# Patient Record
Sex: FEMALE | Race: WHITE | NOT HISPANIC OR LATINO | Employment: OTHER | URBAN - METROPOLITAN AREA
[De-identification: names, ages, dates, MRNs, and addresses within clinical notes are randomized per-mention and may not be internally consistent; named-entity substitution may affect disease eponyms.]

---

## 2017-02-15 ENCOUNTER — HOSPITAL ENCOUNTER (OUTPATIENT)
Dept: RADIOLOGY | Facility: MEDICAL CENTER | Age: 62
End: 2017-02-15
Attending: OBSTETRICS & GYNECOLOGY
Payer: COMMERCIAL

## 2017-02-15 DIAGNOSIS — Z13.9 SCREENING: ICD-10-CM

## 2017-02-15 PROCEDURE — G0202 SCR MAMMO BI INCL CAD: HCPCS

## 2017-02-17 ENCOUNTER — OFFICE VISIT (OUTPATIENT)
Dept: MEDICAL GROUP | Facility: MEDICAL CENTER | Age: 62
End: 2017-02-17
Payer: COMMERCIAL

## 2017-02-17 VITALS
BODY MASS INDEX: 24.55 KG/M2 | SYSTOLIC BLOOD PRESSURE: 118 MMHG | HEIGHT: 61 IN | DIASTOLIC BLOOD PRESSURE: 86 MMHG | OXYGEN SATURATION: 97 % | WEIGHT: 130 LBS | HEART RATE: 96 BPM | TEMPERATURE: 99.3 F

## 2017-02-17 DIAGNOSIS — K21.9 GASTROESOPHAGEAL REFLUX DISEASE, ESOPHAGITIS PRESENCE NOT SPECIFIED: ICD-10-CM

## 2017-02-17 PROCEDURE — 99213 OFFICE O/P EST LOW 20 MIN: CPT | Performed by: PHYSICIAN ASSISTANT

## 2017-02-17 RX ORDER — OMEPRAZOLE 20 MG/1
20 CAPSULE, DELAYED RELEASE ORAL DAILY
Qty: 30 CAP | Refills: 4 | Status: SHIPPED | OUTPATIENT
Start: 2017-02-17 | End: 2017-06-20 | Stop reason: SDUPTHER

## 2017-02-17 RX ORDER — RANITIDINE 150 MG/1
150 TABLET ORAL 2 TIMES DAILY
COMMUNITY
End: 2017-02-17

## 2017-02-17 ASSESSMENT — PAIN SCALES - GENERAL: PAINLEVEL: NO PAIN

## 2017-02-17 ASSESSMENT — PATIENT HEALTH QUESTIONNAIRE - PHQ9: CLINICAL INTERPRETATION OF PHQ2 SCORE: 0

## 2017-02-17 NOTE — ASSESSMENT & PLAN NOTE
Has been treating with zantac qd for a couple month.   Just a couple days ago she start burping and tasted acid in her mouth.  He does have a history of acid reflux. She has taken omeprazole in the past and it has helped.  Although she stopped this for a while and also felt fine. She was controlled with just dietary changes.   EGD- about 5 years ago, hiatal hernia but no gastritis  Diet: tried to eat healthy, hasn't noticed a correlations.  Eats around 6-7 pm. Bed around 10pm. Has not tried a wedge  Exercise: just joined gyn last week.   Had some epigastric pain this am but resolved on it's own.   Denies nausea, vomiting, sore throat, coughing, shortness of breath, tarry stools, blood in stool.  Drinks 3 cups caffeine in am   1 drink etoh nightly.   No drug use.   Aleve maybe once weekly.

## 2017-02-17 NOTE — PROGRESS NOTES
Subjective:     Chief Complaint   Patient presents with   • Heartburn     burping up acid x 1 day     Lovely Palmer is a 62 y.o. female here today for GERD as listed below    GERD (gastroesophageal reflux disease)  Has been treating with zantac qd for a couple month.   Just a couple days ago she start burping and tasted acid in her mouth.  He does have a history of acid reflux. She has taken omeprazole in the past and it has helped.  Although she stopped this for a while and also felt fine. She was controlled with just dietary changes.   EGD- about 5 years ago, hiatal hernia but no gastritis  Diet: tried to eat healthy, hasn't noticed a correlations.  Eats around 6-7 pm. Bed around 10pm. Has not tried a wedge  Exercise: just joined gyn last week.   Had some epigastric pain this am but resolved on it's own.   Denies nausea, vomiting, sore throat, coughing, shortness of breath, tarry stools, blood in stool.  Drinks 3 cups caffeine in am   1 drink etoh nightly.   No drug use.   Aleve maybe once weekly.        's stepsister- passed away with pancreatic cancer    Current medicines (including changes today)  Current Outpatient Prescriptions   Medication Sig Dispense Refill   • omeprazole (PRILOSEC) 20 MG delayed-release capsule Take 1 Cap by mouth every day. 30 Cap 4   • lisinopril (PRINIVIL) 5 MG Tab TAKE 1 TAB BY MOUTH EVERY DAY. 30 Tab 2   • Calcium Acetate, Phos Binder, (CALCIUM ACETATE PO) Take  by mouth.     • ETHIN ESTRADIOL-NORETHIND ACE (JINTELI) 1-5 MG-MCG TABS Take 1 Tab by mouth every morning.     • paroxetine (PAXIL) 20 MG TABS Take 20 mg by mouth every morning. Indications: Generalized Anxiety Disorder       No current facility-administered medications for this visit.     She  has a past medical history of Anxiety (10/30/2014); Bilateral hand numbness (10/30/2014); GERD (gastroesophageal reflux disease) (10/30/2014); Hiatus hernia syndrome; and Pain (08-04-15).    ROS   No chest pain, no shortness  "of breath, + abdominal pain       Objective:     Blood pressure 118/86, pulse 96, temperature 37.4 °C (99.3 °F), height 1.549 m (5' 0.98\"), weight 58.968 kg (130 lb), SpO2 97 %. Body mass index is 24.58 kg/(m^2).   Physical Exam:  Alert, oriented in no acute distress.  Eye contact is good, speech goal directed, affect calm  HEENT: conjunctiva non-injected, sclera non-icteric, PERRL.  Neck No adenopathy or masses in the neck or supraclavicular regions.  Lungs: clear to auscultation bilaterally with good excursion.  CV: regular rate and rhythm.  Abdomen: soft, nontender, no HSM, No CVAT  Ext: no edema, color normal, peripheral pulses 2+, temperature normal        Assessment and Plan:   The following treatment plan was discussed     1. Gastroesophageal reflux disease, esophagitis presence not specified  Flaring, will switch from Zantac to omeprazole 20mg qd.   Discussed diet changes, increasing exercise, elevated bed. Continue not eating right before bed. Portion size.   Will f/u in 3-4 weeks. Sooner if needed.   If no change or still flaring then we will order labs.   - omeprazole (PRILOSEC) 20 MG delayed-release capsule; Take 1 Cap by mouth every day.  Dispense: 30 Cap; Refill: 4      Followup: Return in about 4 weeks (around 3/17/2017) for gerd, laso due for annual exam.           Please note that this dictation was created using voice recognition software. I have made every reasonable attempt to correct obvious errors, but I expect that there are errors of grammar and possibly content that I did not discover before finalizing the note.  "

## 2017-02-17 NOTE — MR AVS SNAPSHOT
"        Lovely Palmer   2017 1:40 PM   Office Visit   MRN: [id number]    Department:  [place] [address]   Dept Phone:  [phone]    Description:  Female : 1955   Provider:  Zoë Rashid PA-C           Reason for Visit     Heartburn burping up acid x 1 day      Allergies as of 2017     No Known Allergies      You were diagnosed with     Gastroesophageal reflux disease, esophagitis presence not specified   [[id number]]         Vital Signs     Blood Pressure Pulse Temperature Height Weight Body Mass Index    118/86 mmHg 96 37.4 °C (99.3 °F) 1.549 m (5' 0.98\") 58.968 kg (130 lb) 24.58 kg/m2    Oxygen Saturation Smoking Status                97% Never Smoker           Basic Information     Date Of Birth Sex Race Ethnicity Preferred Language    1955 Female White Non- English      Your appointments     Mar 13, 2017  3:40 PM   ANNUAL EXAM PREVENTATIVE with Zoë Rashid PA-C   [facility] ([place])    [address]  [address]  [place] [address]   [phone]              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   2013 - Present    Anxiety F41.9   10/30/2014 - Present    Bilateral hand numbness R20.0   10/30/2014 - Present    GERD (gastroesophageal reflux disease) K21.9   10/30/2014 - Present    Abdominal cramping R10.9   2015 - Present    Thoracic or lumbosacral neuritis or radiculitis, unspecified [id number]   2015 - Present    Essential hypertension I10   2015 - Present      Health Maintenance        Date Due Completion Dates    IMM DTaP/Tdap/Td Vaccine (1 - Tdap) 1974 ---    IMM ZOSTER VACCINE 2015 ---    PAP SMEAR 2016 (Prv Comp)    Override on 2013: Previously completed    IMM INFLUENZA (1) 2016 10/1/2011    MAMMOGRAM 2/15/2018 2/15/2017, 2015, 2014, 2013, 2012, 2012, " 4/7/2011, 4/6/2010, 4/6/2010, 4/2/2009, 3/16/2009, 3/16/2009, 3/12/2008, 3/12/2008, 3/12/2007    COLONOSCOPY 1/30/2021 1/30/2011 (Prv Comp)    Override on 1/30/2011: Previously completed (10 year)            Current Immunizations     Influenza TIV (IM) 10/1/2011      Below and/or attached are the medications your provider expects you to take. Review all of your home medications and newly ordered medications with your provider and/or pharmacist. Follow medication instructions as directed by your provider and/or pharmacist. Please keep your medication list with you and share with your provider. Update the information when medications are discontinued, doses are changed, or new medications (including over-the-counter products) are added; and carry medication information at all times in the event of emergency situations     Allergies:  No Known Allergies          Medications  Valid as of: February 17, 2017 -  2:13 PM    Generic Name Brand Name Tablet Size Instructions for use    Calcium Acetate (Phos Binder)   Take  by mouth.        Lisinopril (Tab) PRINIVIL 5 MG TAKE 1 TAB BY MOUTH EVERY DAY.        Norethindrone-Eth Estradiol (Tab) ETHIN ESTRADIOL-NORETHIND ACE 1-5 MG-MCG Take 1 Tab by mouth every morning.        Omeprazole (CAPSULE DELAYED RELEASE) PRILOSEC 20 MG Take 1 Cap by mouth every day.        PARoxetine HCl (Tab) PAXIL 20 MG Take 20 mg by mouth every morning. Indications: Generalized Anxiety Disorder        .                 Medicines prescribed today were sent to:     [id number] [place] - [address]    [address] [facility] [address]    Phone: [phone] Fax: [phone]    Open 24 Hours?: No    [id number] - [place] - [address]    [address] [facility] [address]    Phone: [phone] Fax: [phone]    Open 24 Hours?: No      Medication refill instructions:       If your prescription bottle indicates you have medication refills left, it is not necessary to  call your provider’s office. Please contact your pharmacy and they will refill your medication.    If your prescription bottle indicates you do not have any refills left, you may request refills at any time through one of the following ways: The online LawP[employer] system (except Urgent Care), by calling your provider’s office, or by asking your pharmacy to contact your provider’s office with a refill request. Medication refills are processed only during regular business hours and may not be available until the next business day. Your provider may request additional information or to have a follow-up visit with you prior to refilling your medication.   *Please Note: Medication refills are assigned a new Rx number when refilled electronically. Your pharmacy may indicate that no refills were authorized even though a new prescription for the same medication is available at the pharmacy. Please request the medicine by name with the pharmacy before contacting your provider for a refill.           LawPivot Access Code: Activation code not generated  Current LawPivot Status: Active

## 2017-02-17 NOTE — PATIENT INSTRUCTIONS
Food Choices for Gastroesophageal Reflux Disease, Adult  When you have gastroesophageal reflux disease (GERD), the foods you eat and your eating habits are very important. Choosing the right foods can help ease the discomfort of GERD.  WHAT GENERAL GUIDELINES DO I NEED TO FOLLOW?  · Choose fruits, vegetables, whole grains, low-fat dairy products, and low-fat meat, fish, and poultry.  · Limit fats such as oils, salad dressings, butter, nuts, and avocado.  · Keep a food diary to identify foods that cause symptoms.  · Avoid foods that cause reflux. These may be different for different people.  · Eat frequent small meals instead of three large meals each day.  · Eat your meals slowly, in a relaxed setting.  · Limit fried foods.  · Cook foods using methods other than frying.  · Avoid drinking alcohol.  · Avoid drinking large amounts of liquids with your meals.  · Avoid bending over or lying down until 2-3 hours after eating.  WHAT FOODS ARE NOT RECOMMENDED?  The following are some foods and drinks that may worsen your symptoms:  Vegetables  Tomatoes. Tomato juice. Tomato and spaghetti sauce. Chili peppers. Onion and garlic. Horseradish.  Fruits  Oranges, grapefruit, and lemon (fruit and juice).  Meats  High-fat meats, fish, and poultry. This includes hot dogs, ribs, ham, sausage, salami, and hoffman.  Dairy  Whole milk and chocolate milk. Sour cream. Cream. Butter. Ice cream. Cream cheese.   Beverages  Coffee and tea, with or without caffeine. Carbonated beverages or energy drinks.  Condiments  Hot sauce. Barbecue sauce.   Sweets/Desserts  Chocolate and cocoa. Donuts. Peppermint and spearmint.  Fats and Oils  High-fat foods, including French fries and potato chips.  Other  Vinegar. Strong spices, such as black pepper, white pepper, red pepper, cayenne, mata powder, cloves, ginger, and chili powder.  The items listed above may not be a complete list of foods and beverages to avoid. Contact your dietitian for more  information.     This information is not intended to replace advice given to you by your health care provider. Make sure you discuss any questions you have with your health care provider.     Document Released: 12/18/2006 Document Revised: 01/08/2016 Document Reviewed: 10/22/2014  [employer] Interactive Patient Education ©2016 [employer] Inc.

## 2017-03-13 ENCOUNTER — APPOINTMENT (OUTPATIENT)
Dept: MEDICAL GROUP | Facility: MEDICAL CENTER | Age: 62
End: 2017-03-13
Payer: COMMERCIAL

## 2017-04-26 ENCOUNTER — OFFICE VISIT (OUTPATIENT)
Dept: MEDICAL GROUP | Facility: MEDICAL CENTER | Age: 62
End: 2017-04-26
Payer: COMMERCIAL

## 2017-04-26 VITALS
WEIGHT: 131.84 LBS | DIASTOLIC BLOOD PRESSURE: 88 MMHG | SYSTOLIC BLOOD PRESSURE: 132 MMHG | BODY MASS INDEX: 24.89 KG/M2 | HEIGHT: 61 IN | HEART RATE: 78 BPM | TEMPERATURE: 99.1 F | OXYGEN SATURATION: 97 % | RESPIRATION RATE: 16 BRPM

## 2017-04-26 DIAGNOSIS — F41.9 ANXIETY: ICD-10-CM

## 2017-04-26 DIAGNOSIS — Z13.6 SCREENING FOR CARDIOVASCULAR CONDITION: ICD-10-CM

## 2017-04-26 DIAGNOSIS — M54.50 CHRONIC BILATERAL LOW BACK PAIN WITHOUT SCIATICA: ICD-10-CM

## 2017-04-26 DIAGNOSIS — Z23 NEED FOR SHINGLES VACCINE: ICD-10-CM

## 2017-04-26 DIAGNOSIS — Z87.39 HISTORY OF AVASCULAR NECROSIS OF CAPITAL FEMORAL EPIPHYSIS: ICD-10-CM

## 2017-04-26 DIAGNOSIS — E55.9 VITAMIN D DEFICIENCY DISEASE: ICD-10-CM

## 2017-04-26 DIAGNOSIS — I10 ESSENTIAL HYPERTENSION: ICD-10-CM

## 2017-04-26 DIAGNOSIS — Z13.29 SCREENING FOR THYROID DISORDER: ICD-10-CM

## 2017-04-26 DIAGNOSIS — G89.29 CHRONIC BILATERAL LOW BACK PAIN WITHOUT SCIATICA: ICD-10-CM

## 2017-04-26 DIAGNOSIS — Z13.1 SCREENING FOR DIABETES MELLITUS: ICD-10-CM

## 2017-04-26 DIAGNOSIS — K21.9 GASTROESOPHAGEAL REFLUX DISEASE, ESOPHAGITIS PRESENCE NOT SPECIFIED: ICD-10-CM

## 2017-04-26 DIAGNOSIS — Z00.00 PREVENTATIVE HEALTH CARE: ICD-10-CM

## 2017-04-26 PROCEDURE — 99396 PREV VISIT EST AGE 40-64: CPT | Performed by: PHYSICIAN ASSISTANT

## 2017-04-26 RX ORDER — LISINOPRIL 10 MG/1
10 TABLET ORAL
Qty: 90 TAB | Refills: 3 | Status: SHIPPED | OUTPATIENT
Start: 2017-04-26 | End: 2018-04-17 | Stop reason: SDUPTHER

## 2017-04-26 RX ORDER — DICLOFENAC SODIUM 75 MG/1
75 TABLET, DELAYED RELEASE ORAL 2 TIMES DAILY WITH MEALS
Refills: 5 | COMMUNITY
Start: 2017-03-25 | End: 2019-02-28

## 2017-04-26 RX ORDER — PAROXETINE HYDROCHLORIDE 40 MG/1
40 TABLET, FILM COATED ORAL EVERY MORNING
Qty: 90 TAB | Refills: 1 | Status: SHIPPED | OUTPATIENT
Start: 2017-04-26 | End: 2017-10-22 | Stop reason: SDUPTHER

## 2017-04-26 RX ORDER — PAROXETINE HYDROCHLORIDE 40 MG/1
40 TABLET, FILM COATED ORAL EVERY MORNING
Qty: 90 TAB | Refills: 1 | Status: SHIPPED | OUTPATIENT
Start: 2017-04-26 | End: 2017-04-26 | Stop reason: SDUPTHER

## 2017-04-26 ASSESSMENT — PATIENT HEALTH QUESTIONNAIRE - PHQ9
2. FEELING DOWN, DEPRESSED, IRRITABLE, OR HOPELESS: 3
4. FEELING TIRED OR HAVING LITTLE ENERGY: 1
SUM OF ALL RESPONSES TO PHQ9 QUESTIONS 1 AND 2: 4
7. TROUBLE CONCENTRATING ON THINGS, SUCH AS READING THE NEWSPAPER OR WATCHING TELEVISION: 1
SUM OF ALL RESPONSES TO PHQ QUESTIONS 1-9: 7
6. FEELING BAD ABOUT YOURSELF - OR THAT YOU ARE A FAILURE OR HAVE LET YOURSELF OR YOUR FAMILY DOWN: 0
1. LITTLE INTEREST OR PLEASURE IN DOING THINGS: 1
9. THOUGHTS THAT YOU WOULD BE BETTER OFF DEAD, OR OF HURTING YOURSELF: 0
8. MOVING OR SPEAKING SO SLOWLY THAT OTHER PEOPLE COULD HAVE NOTICED. OR THE OPPOSITE, BEING SO FIGETY OR RESTLESS THAT YOU HAVE BEEN MOVING AROUND A LOT MORE THAN USUAL: 0
3. TROUBLE FALLING OR STAYING ASLEEP OR SLEEPING TOO MUCH: 1
5. POOR APPETITE OR OVEREATING: 0

## 2017-04-26 ASSESSMENT — PAIN SCALES - GENERAL: PAINLEVEL: NO PAIN

## 2017-04-26 NOTE — ASSESSMENT & PLAN NOTE
Better with prilosec 20mg qd.   No sx since been on prilosec.   Still avoiding aggravating foods.   Last EDG- around 5 years ago. Was normal other than hiatal hernia per pt.   etoh- 1 glass nightly.

## 2017-04-26 NOTE — ASSESSMENT & PLAN NOTE
Around 2-3 mo ago increased lisinopril from 5mg qd to 5mg bid.   Home BP has been around 130's/high 80's  Stated sometimes doesn't remember night dose.   Diet: no table salt, healthy diet  Exercise: 2-3 time weeks.   etoh- 1 glass nightly.   Tobacco- none.   No fhx of early heart disease

## 2017-04-26 NOTE — ASSESSMENT & PLAN NOTE
Has been taking paxil since 1986.   Currently taking paxil 20mg qd.   Flaring slightly since her  are in the middle of splitting.   They are looking for another house trying to sell their house prior to being able to split.   Feels she has not been able to concentrate lately, does feel she is forgetful, forgetting names forgetting pin numbers.   Has seen counselor around 5 years ago. When  and her split up.   Caffeine: 2 cups daily, no sidas, no energy drinks.   Diet: healthier diet    Exercise: 2-3 times weekly.     etoh- 1 nightly   Drug use: none   PHQ9 score 7 denies SI/SH.     LILY-7  Over the last 2 weeks, on how many days have you been bothered by the following problems?  1. Feeling nervous, anxious or on edge  3  2. Not being able stop or control worry  1  3. Worrying too much about different things  0  4. Trouble relaxing  1  5. Being so restless it is hard to sit still  1  6. Becoming easily annoyed or irritable  0  7. Feeling afraid as if something awful might happen 1  Total= 7

## 2017-04-26 NOTE — MR AVS SNAPSHOT
"        Lovely Palmer   2017 2:00 PM   Office Visit   MRN: [id number]    Department:  [place] [address]   Dept Phone:  [phone]    Description:  Female : 1955   Provider:  Zoë Rashid PA-C           Reason for Visit     Annual Exam           Allergies as of 2017     No Known Allergies      You were diagnosed with     Preventative health care   [316679]       History of avascular necrosis of capital femoral epiphysis   [096058]       Gastroesophageal reflux disease, esophagitis presence not specified   [9996389]       Essential hypertension   [3283751]       Chronic bilateral low back pain without sciatica   [6010515]       Anxiety   [248006]       Need for shingles vaccine   [523446]       Screening for cardiovascular condition   [784961]       Screening for diabetes mellitus   [V77.1.ICD-9-CM]       Vitamin D deficiency disease   [437335]       Screening for thyroid disorder   [V77.0.ICD-9-CM]         Vital Signs     Blood Pressure Pulse Temperature Respirations Height Weight    132/88 mmHg 78 37.3 °C (99.1 °F) 16 1.549 m (5' 0.98\") 59.8 kg (131 lb 13.4 oz)    Body Mass Index Oxygen Saturation Breastfeeding? Smoking Status          24.92 kg/m2 97% No Never Smoker         Basic Information     Date Of Birth Sex Race Ethnicity Preferred Language    1955 Female White Non- English      Your appointments     May 10, 2017  2:20 PM   Established Patient with Zoë Rashid PA-C   [facility] ([place]    [address]  [address]  [place] [id number] [phone]           You will be receiving a confirmation call a few days before your appointment from our automated call confirmation system.              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   2013 - Present    Anxiety F41.9   10/30/2014 - Present    Bilateral hand numbness R20.0   " 10/30/2014 - Present    GERD (gastroesophageal reflux disease) K21.9   10/30/2014 - Present    Abdominal cramping R10.9   4/13/2015 - Present    Chronic bilateral low back pain without sciatica M54.5, G89.29   8/5/2015 - Present    Essential hypertension I10   8/6/2015 - Present    Preventative health care Z00.00   4/26/2017 - Present      Health Maintenance        Date Due Completion Dates    IMM DTaP/Tdap/Td Vaccine (1 - Tdap) 1/25/1974 ---    IMM ZOSTER VACCINE 1/25/2015 ---    PAP SMEAR 1/30/2016 1/30/2013 (Prv Comp)    Override on 1/30/2013: Previously completed    MAMMOGRAM 2/15/2018 2/15/2017, 12/7/2015, 12/4/2014, 5/28/2013, 4/12/2012, 4/9/2012, 4/7/2011, 4/6/2010, 4/6/2010, 4/2/2009, 3/16/2009, 3/16/2009, 3/12/2008, 3/12/2008, 3/12/2007    COLONOSCOPY 1/30/2021 1/30/2011 (Prv Comp)    Override on 1/30/2011: Previously completed (10 year)            Current Immunizations     Influenza TIV (IM) 10/1/2011      Below and/or attached are the medications your provider expects you to take. Review all of your home medications and newly ordered medications with your provider and/or pharmacist. Follow medication instructions as directed by your provider and/or pharmacist. Please keep your medication list with you and share with your provider. Update the information when medications are discontinued, doses are changed, or new medications (including over-the-counter products) are added; and carry medication information at all times in the event of emergency situations     Allergies:  No Known Allergies          Medications  Valid as of: April 26, 2017 -  3:19 PM    Generic Name Brand Name Tablet Size Instructions for use    Calcium Acetate (Phos Binder)   Take  by mouth.        Diclofenac Sodium (Tablet Delayed Response) VOLTAREN 75 MG Take 75 mg by mouth 2 times a day, with meals.        Lisinopril (Tab) PRINIVIL 10 MG Take 1 Tab by mouth every day. TAKE 1 TAB BY MOUTH EVERY DAY.        Norethindrone-Eth Estradiol  (Tab) ETHIN ESTRADIOL-NORETHIND ACE 1-5 MG-MCG Take 1 Tab by mouth every morning.        Omeprazole (CAPSULE DELAYED RELEASE) PRILOSEC 20 MG Take 1 Cap by mouth every day.        PARoxetine HCl (Tab) PAXIL 40 MG Take 1 Tab by mouth every morning. Indications: Generalized Anxiety Disorder        Zoster Vaccine Live (Recon Soln) ZOSTAVAX 27328 UNT/0.65ML Inject 0.65 mL as instructed Once for 1 dose.        .                 Medicines prescribed today were sent to:     [id number] - [place], [place] - [address]    [address] [place] [address]    Phone: [phone] Fax: [phone]    Open 24 Hours?: No      Medication refill instructions:       If your prescription bottle indicates you have medication refills left, it is not necessary to call your provider’s office. Please contact your pharmacy and they will refill your medication.    If your prescription bottle indicates you do not have any refills left, you may request refills at any time through one of the following ways: The online [employer] system (except Urgent Care), by calling your provider’s office, or by asking your pharmacy to contact your provider’s office with a refill request. Medication refills are processed only during regular business hours and may not be available until the next business day. Your provider may request additional information or to have a follow-up visit with you prior to refilling your medication.   *Please Note: Medication refills are assigned a new Rx number when refilled electronically. Your pharmacy may indicate that no refills were authorized even though a new prescription for the same medication is available at the pharmacy. Please request the medicine by name with the pharmacy before contacting your provider for a refill.        Your To Do List     Future Labs/Procedures Complete By Expires    CBC WITH DIFFERENTIAL  As directed 4/27/2018    COMP METABOLIC PANEL  As directed 4/27/2018    FREE THYROXINE  As directed  4/27/2018    LIPID PROFILE  As directed 4/27/2018    TSH  As directed 4/27/2018    VITAMIN D,25 HYDROXY  As directed 4/27/2018      Referral     A referral request has been sent to our patient care coordination department. Please allow 3-5 business days for us to process this request and contact you either by phone or mail. If you do not hear from us by the 5th business day, please call us at [phone].           Skimlinks Access Code: Activation code not generated  Current Skimlinks Status: Active

## 2017-04-26 NOTE — ASSESSMENT & PLAN NOTE
Persistent back pain.   Had discs cleaned out 2 years ago.   Also had right SHAHANA.   Followed by [facility].   Had PT but none lately.   Stretches occasionally.   Denies bowel or bladder incontinence, saddle paresthesias, muscle weakness

## 2017-04-27 NOTE — PROGRESS NOTES
Subjective:     Chief Complaint   Patient presents with   • Annual Exam     Lovely Palmer is a 62 y.o. female here today for annual with complaint of slightly increased anxiety and memory issues as listed below    GERD (gastroesophageal reflux disease)  Better with prilosec 20mg qd.   No sx since been on prilosec.   Still avoiding aggravating foods.   Last EDG- around 5 years ago. Was normal other than hiatal hernia per pt.   etoh- 1 glass nightly.     Essential hypertension  Around 2-3 mo ago increased lisinopril from 5mg qd to 5mg bid.   Home BP has been around 130's/high 80's  Stated sometimes doesn't remember night dose.   Diet: no table salt, healthy diet  Exercise: 2-3 time weeks.   etoh- 1 glass nightly.   Tobacco- none.   No fhx of early heart disease    Chronic bilateral low back pain without sciatica  Persistent back pain.   Had discs cleaned out 2 years ago.   Also had right SHAHANA.   Followed by [facility].   Had PT but none lately.   Stretches occasionally.   Denies bowel or bladder incontinence, saddle paresthesias, muscle weakness    Anxiety  Has been taking paxil since 1986.   Currently taking paxil 20mg qd.   Flaring slightly since her  are in the middle of splitting.   They are looking for another house trying to sell their house prior to being able to split.   Feels she has not been able to concentrate lately, does feel she is forgetful, forgetting names forgetting pin numbers.   Has seen counselor around 5 years ago. When  and her split up.   Caffeine: 2 cups daily, no sidas, no energy drinks.   Diet: healthier diet    Exercise: 2-3 times weekly.     etoh- 1 nightly   Drug use: none   PHQ9 score 7 denies SI/SH.     LILY-7  Over the last 2 weeks, on how many days have you been bothered by the following problems?  1. Feeling nervous, anxious or on edge  3  2. Not being able stop or control worry  1  3. Worrying too much about different things  0  4. Trouble relaxing  1  5. Being so restless it  "is hard to sit still  1  6. Becoming easily annoyed or irritable  0  7. Feeling afraid as if something awful might happen 1  Total= 7         Current medicines (including changes today)  Current Outpatient Prescriptions   Medication Sig Dispense Refill   • diclofenac EC (VOLTAREN) 75 MG Tablet Delayed Response Take 75 mg by mouth 2 times a day, with meals.  5   • zoster vaccine live, PF, (ZOSTAVAX) 00359 UNT/0.65ML injection Inject 0.65 mL as instructed Once for 1 dose. 0.65 mL 0   • lisinopril (PRINIVIL) 10 MG Tab Take 1 Tab by mouth every day. TAKE 1 TAB BY MOUTH EVERY DAY. 90 Tab 3   • paroxetine (PAXIL) 40 MG tablet Take 1 Tab by mouth every morning. Indications: Generalized Anxiety Disorder 90 Tab 1   • omeprazole (PRILOSEC) 20 MG delayed-release capsule Take 1 Cap by mouth every day. 30 Cap 4   • Calcium Acetate, Phos Binder, (CALCIUM ACETATE PO) Take  by mouth.     • ETHIN ESTRADIOL-NORETHIND ACE (JINTELI) 1-5 MG-MCG TABS Take 1 Tab by mouth every morning.       No current facility-administered medications for this visit.     She  has a past medical history of Anxiety (10/30/2014); Bilateral hand numbness (10/30/2014); GERD (gastroesophageal reflux disease) (10/30/2014); Hiatus hernia syndrome; and Pain (08-04-15).    ROS   No chest pain, no shortness of breath, no abdominal pain       Objective:     Blood pressure 132/88, pulse 78, temperature 37.3 °C (99.1 °F), resp. rate 16, height 1.549 m (5' 0.98\"), weight 59.8 kg (131 lb 13.4 oz), SpO2 97 %, not currently breastfeeding. Body mass index is 24.92 kg/(m^2).   Physical Exam:  Alert, oriented in no acute distress.  Eye contact is good, speech goal directed, affect calm  HEENT: conjunctiva non-injected, sclera non-icteric, PERRL.  Pinna normal. TM pearly gray.   Oral mucous membranes pink and moist with no lesions.  Neck No adenopathy or masses in the neck or supraclavicular regions.  Lungs: clear to auscultation bilaterally with good excursion.  CV: regular " rate and rhythm.  Abdomen: soft, nontender, no HSM, No CVAT  Ext: no edema, color normal, peripheral pulses 2+, temperature normal  MMSE- 30    Assessment and Plan:   The following treatment plan was discussed     1. Preventative health care  mammo- 2/15/17  Pap- annually Yennifer Martinez MD   Colonoscopy- 1/30/11  Shingles- none  TDap- unknown    2. History of avascular necrosis of capital femoral epiphysis  Resolved since SHAHANA    3. Gastroesophageal reflux disease, esophagitis presence not specified  Controlled, continue current regimen    4. Essential hypertension  Occasionally uncontrolled, she does forget her nighttime dosing we did discuss changing her medication to lisinopril 10 mg daily which does not worry about the nighttime dosing.  Continue working on diet and exercise  - lisinopril (PRINIVIL) 10 MG Tab; Take 1 Tab by mouth every day. TAKE 1 TAB BY MOUTH EVERY DAY.  Dispense: 90 Tab; Refill: 3  - CBC WITH DIFFERENTIAL; Future    5. Chronic bilateral low back pain without sciatica  Stable, continue seeing the [facility]. Going to physical therapy if needed    6. Anxiety  Uncontrolled, we will increase Paxil from 20 mg daily to 40 mg daily.  Otherwise continue with healthy lifestyle, eating healthy, increase exercise. Recommend returning to see counselor as well. Referral was placed today   - paroxetine (PAXIL) 40 MG tablet; Take 1 Tab by mouth every morning. Indications: Generalized Anxiety Disorder  Dispense: 90 Tab; Refill: 1  - COMP METABOLIC PANEL; Future  - TSH; Future  - FREE THYROXINE; Future  - REFERRAL TO BEHAVIORAL HEALTH    7. Need for shingles vaccine  Rx given for shingles vaccine  - zoster vaccine live, PF, (ZOSTAVAX) 15871 UNT/0.65ML injection; Inject 0.65 mL as instructed Once for 1 dose.  Dispense: 0.65 mL; Refill: 0    8. Screening for cardiovascular condition  Labs ordered, will call for results  - COMP METABOLIC PANEL; Future  - LIPID PROFILE; Future    9. Screening for diabetes mellitus  Labs  ordered, will call for results  - COMP METABOLIC PANEL; Future    10. Vitamin D deficiency disease  Labs ordered, will call for results  - VITAMIN D,25 HYDROXY; Future    11. Screening for thyroid disorder  Labs ordered, will call for results  - TSH; Future  - FREE THYROXINE; Future    Followup: Return in about 4 weeks (around 5/24/2017) for anxiety.           Please note that this dictation was created using voice recognition software. I have made every reasonable attempt to correct obvious errors, but I expect that there are errors of grammar and possibly content that I did not discover before finalizing the note.

## 2017-05-10 ENCOUNTER — APPOINTMENT (OUTPATIENT)
Dept: MEDICAL GROUP | Facility: MEDICAL CENTER | Age: 62
End: 2017-05-10
Payer: COMMERCIAL

## 2017-05-17 LAB
25(OH)D3+25(OH)D2 SERPL-MCNC: 39.3 NG/ML (ref 30–100)
ALBUMIN SERPL-MCNC: 4.3 G/DL (ref 3.6–4.8)
ALBUMIN/GLOB SERPL: 1.7 {RATIO} (ref 1.2–2.2)
ALP SERPL-CCNC: 57 IU/L (ref 39–117)
ALT SERPL-CCNC: 25 IU/L (ref 0–32)
AST SERPL-CCNC: 28 IU/L (ref 0–40)
BASOPHILS # BLD AUTO: 0 X10E3/UL (ref 0–0.2)
BASOPHILS NFR BLD AUTO: 1 %
BILIRUB SERPL-MCNC: 0.4 MG/DL (ref 0–1.2)
BUN SERPL-MCNC: 11 MG/DL (ref 8–27)
BUN/CREAT SERPL: 14 (ref 12–28)
CALCIUM SERPL-MCNC: 9 MG/DL (ref 8.7–10.3)
CHLORIDE SERPL-SCNC: 104 MMOL/L (ref 96–106)
CHOLEST SERPL-MCNC: 238 MG/DL (ref 100–199)
CO2 SERPL-SCNC: 23 MMOL/L (ref 18–29)
COMMENT 011824: ABNORMAL
CREAT SERPL-MCNC: 0.79 MG/DL (ref 0.57–1)
EOSINOPHIL # BLD AUTO: 0.1 X10E3/UL (ref 0–0.4)
EOSINOPHIL NFR BLD AUTO: 2 %
ERYTHROCYTE [DISTWIDTH] IN BLOOD BY AUTOMATED COUNT: 13.9 % (ref 12.3–15.4)
GLOBULIN SER CALC-MCNC: 2.5 G/DL (ref 1.5–4.5)
GLUCOSE SERPL-MCNC: 86 MG/DL (ref 65–99)
HCT VFR BLD AUTO: 46.9 % (ref 34–46.6)
HDLC SERPL-MCNC: 87 MG/DL
HGB BLD-MCNC: 14.8 G/DL (ref 11.1–15.9)
IMM GRANULOCYTES # BLD: 0 X10E3/UL (ref 0–0.1)
IMM GRANULOCYTES NFR BLD: 0 %
IMMATURE CELLS  115398: ABNORMAL
LDLC SERPL CALC-MCNC: 135 MG/DL (ref 0–99)
LYMPHOCYTES # BLD AUTO: 1.5 X10E3/UL (ref 0.7–3.1)
LYMPHOCYTES NFR BLD AUTO: 38 %
MCH RBC QN AUTO: 33 PG (ref 26.6–33)
MCHC RBC AUTO-ENTMCNC: 31.6 G/DL (ref 31.5–35.7)
MCV RBC AUTO: 105 FL (ref 79–97)
MONOCYTES # BLD AUTO: 0.3 X10E3/UL (ref 0.1–0.9)
MONOCYTES NFR BLD AUTO: 8 %
MORPHOLOGY BLD-IMP: ABNORMAL
NEUTROPHILS # BLD AUTO: 2.1 X10E3/UL (ref 1.4–7)
NEUTROPHILS NFR BLD AUTO: 51 %
NRBC BLD AUTO-RTO: ABNORMAL %
PLATELET # BLD AUTO: 272 X10E3/UL (ref 150–379)
POTASSIUM SERPL-SCNC: 4.7 MMOL/L (ref 3.5–5.2)
PROT SERPL-MCNC: 6.8 G/DL (ref 6–8.5)
RBC # BLD AUTO: 4.48 X10E6/UL (ref 3.77–5.28)
SODIUM SERPL-SCNC: 144 MMOL/L (ref 134–144)
T4 FREE SERPL-MCNC: 1.22 NG/DL (ref 0.82–1.77)
TRIGL SERPL-MCNC: 78 MG/DL (ref 0–149)
TSH SERPL DL<=0.005 MIU/L-ACNC: 1.17 UIU/ML (ref 0.45–4.5)
VLDLC SERPL CALC-MCNC: 16 MG/DL (ref 5–40)
WBC # BLD AUTO: 4.1 X10E3/UL (ref 3.4–10.8)

## 2017-05-19 ENCOUNTER — TELEPHONE (OUTPATIENT)
Dept: MEDICAL GROUP | Facility: MEDICAL CENTER | Age: 62
End: 2017-05-19

## 2017-05-19 NOTE — TELEPHONE ENCOUNTER
Phone Number Called: [phone] (home) [phone] (work)    Message: Patient informed. Patient would like to know what can increase red blood cell. Please advise.     Left Message for patient to call back: yes

## 2017-05-19 NOTE — TELEPHONE ENCOUNTER
----- Message from Zoë Rashid PA-C sent at 5/19/2017 12:37 PM PDT -----  Please inform patient that overall her labs look great.  Her red blood cell showed a slight increase and therefore we should just recheck in 6mo, her white blood cells and platelets were otherwise normal.   Her cholesterol has remained stable  Thyroid function, vitamin D, electrolytes, kidney function, liver function were all normal  Thank you  Zoë

## 2017-05-20 NOTE — TELEPHONE ENCOUNTER
There are several things that can elevated RBC   Higher altitude, smoking, cardiovascular issues, increase her iron intake, as well as some autoimmune disorders and several other things.   Typically we just recheck sometimes they go back to normal especially occurs or very mild.   If they persist to be elevated then we will do further labs to investigate.  Thank you  Zoë

## 2017-05-22 ENCOUNTER — OFFICE VISIT (OUTPATIENT)
Dept: MEDICAL GROUP | Facility: MEDICAL CENTER | Age: 62
End: 2017-05-22
Payer: COMMERCIAL

## 2017-05-22 VITALS
TEMPERATURE: 99.1 F | OXYGEN SATURATION: 96 % | HEIGHT: 61 IN | HEART RATE: 109 BPM | BODY MASS INDEX: 24.14 KG/M2 | DIASTOLIC BLOOD PRESSURE: 70 MMHG | WEIGHT: 127.87 LBS | SYSTOLIC BLOOD PRESSURE: 130 MMHG | RESPIRATION RATE: 16 BRPM

## 2017-05-22 DIAGNOSIS — F41.9 ANXIETY: ICD-10-CM

## 2017-05-22 DIAGNOSIS — I10 ESSENTIAL HYPERTENSION: ICD-10-CM

## 2017-05-22 PROCEDURE — 99214 OFFICE O/P EST MOD 30 MIN: CPT | Performed by: PHYSICIAN ASSISTANT

## 2017-05-22 RX ORDER — PAROXETINE HYDROCHLORIDE 20 MG/1
20 TABLET, FILM COATED ORAL
Refills: 12 | COMMUNITY
Start: 2017-04-27 | End: 2017-05-22

## 2017-05-22 RX ORDER — ALPRAZOLAM 0.5 MG/1
0.5 TABLET ORAL NIGHTLY PRN
Qty: 5 TAB | Refills: 1 | Status: SHIPPED | OUTPATIENT
Start: 2017-05-22 | End: 2017-07-20 | Stop reason: SDUPTHER

## 2017-05-22 RX ORDER — LISINOPRIL 5 MG/1
5 TABLET ORAL 2 TIMES DAILY
Refills: 5 | COMMUNITY
Start: 2017-03-20 | End: 2017-05-22

## 2017-05-22 ASSESSMENT — PATIENT HEALTH QUESTIONNAIRE - PHQ9
9. THOUGHTS THAT YOU WOULD BE BETTER OFF DEAD, OR OF HURTING YOURSELF: 0
2. FEELING DOWN, DEPRESSED, IRRITABLE, OR HOPELESS: 2
SUM OF ALL RESPONSES TO PHQ QUESTIONS 1-9: 4
4. FEELING TIRED OR HAVING LITTLE ENERGY: 0
SUM OF ALL RESPONSES TO PHQ9 QUESTIONS 1 AND 2: 3
6. FEELING BAD ABOUT YOURSELF - OR THAT YOU ARE A FAILURE OR HAVE LET YOURSELF OR YOUR FAMILY DOWN: 1
5. POOR APPETITE OR OVEREATING: 0
7. TROUBLE CONCENTRATING ON THINGS, SUCH AS READING THE NEWSPAPER OR WATCHING TELEVISION: 0
1. LITTLE INTEREST OR PLEASURE IN DOING THINGS: 1
3. TROUBLE FALLING OR STAYING ASLEEP OR SLEEPING TOO MUCH: 0
8. MOVING OR SPEAKING SO SLOWLY THAT OTHER PEOPLE COULD HAVE NOTICED. OR THE OPPOSITE, BEING SO FIGETY OR RESTLESS THAT YOU HAVE BEEN MOVING AROUND A LOT MORE THAN USUAL: 0

## 2017-05-22 ASSESSMENT — PAIN SCALES - GENERAL: PAINLEVEL: NO PAIN

## 2017-05-22 NOTE — ASSESSMENT & PLAN NOTE
increase Paxil from 20 mg daily to 40 mg daily  Has been taking paxil since 1986.   Flaring slightly since her  are in the middle of splitting.   They are looking for another house trying to sell their house prior to being able to split. It is taking a long time to sell the house, just had someone back out last minute.   Does feel a little better but stomach still turns occasionally and can't relax sometimes.   Stated as soon as she is in a better situation she knows she will feel better.   Has seen counselor around 5 years ago. Will be trying to see one again but hasn't sone this yet.    Caffeine: 2 cups daily, no sidas, no energy drinks.   Diet: healthier diet    Exercise: 2 times weekly although will increase again since no longer packing.   etoh- 1 nightly   Drug use: none   PHQ9 score decreased from 7 down to 4, denies SI/SH.     LILY-7  Over the last 2 weeks, on how many days have you been bothered by the following problems?  1. Feeling nervous, anxious or on edge  2  2. Not being able stop or control worry  2  3. Worrying too much about different things  0  4. Trouble relaxing  0  5. Being so restless it is hard to sit still  0  6. Becoming easily annoyed or irritable  0  7. Feeling afraid as if something awful might happen 0  Total= went from 7 to 4

## 2017-05-22 NOTE — MR AVS SNAPSHOT
"Lovely Palmer   2017 3:40 PM   Office Visit   MRN: [id number]    Department:  [place] [address]   Dept Phone:  [phone]    Description:  Female : 1955   Provider:  Zoë Rashid PA-C           Reason for Visit     Follow-Up med review      Allergies as of 2017     No Known Allergies      You were diagnosed with     Anxiety   [236731]       Essential hypertension   [7609719]         Vital Signs     Blood Pressure Pulse Temperature Respirations Height Weight    130/70 mmHg 109 37.3 °C (99.1 °F) 16 1.549 m (5' 0.98\") 58 kg (127 lb 13.9 oz)    Body Mass Index Oxygen Saturation Breastfeeding? Smoking Status          24.17 kg/m2 96% No Never Smoker         Basic Information     Date Of Birth Sex Race Ethnicity Preferred Language    1955 Female White Non- English      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   2013 - Present    Anxiety F41.9   10/30/2014 - Present    Bilateral hand numbness R20.0   10/30/2014 - Present    GERD (gastroesophageal reflux disease) K21.9   10/30/2014 - Present    Abdominal cramping R10.9   2015 - Present    Chronic bilateral low back pain without sciatica M54.5, G89.29   2015 - Present    Essential hypertension I10   2015 - Present    Preventative health care Z00.00   2017 - Present      Health Maintenance        Date Due Completion Dates    IMM DTaP/Tdap/Td Vaccine (1 - Tdap) 1974 ---    IMM ZOSTER VACCINE 2015 ---    PAP SMEAR 2016 (Prv Comp)    Override on 2013: Previously completed    MAMMOGRAM 2/15/2018 2/15/2017, 2015, 2014, 2013, 2012, 2012, 2011, 2010, 2010, 2009, 3/16/2009, 3/16/2009, 3/12/2008, 3/12/2008, 3/12/2007    COLONOSCOPY 2021 (Prv Comp)    Override on 2011: Previously completed (10 year)            Current Immunizations    " Influenza TIV (IM) 10/1/2011      Below and/or attached are the medications your provider expects you to take. Review all of your home medications and newly ordered medications with your provider and/or pharmacist. Follow medication instructions as directed by your provider and/or pharmacist. Please keep your medication list with you and share with your provider. Update the information when medications are discontinued, doses are changed, or new medications (including over-the-counter products) are added; and carry medication information at all times in the event of emergency situations     Allergies:  No Known Allergies          Medications  Valid as of: May 22, 2017 -  4:54 PM    Generic Name Brand Name Tablet Size Instructions for use    ALPRAZolam (Tab) XANAX 0.5 MG Take 1 Tab by mouth at bedtime as needed for Sleep or Anxiety.        Calcium Acetate (Phos Binder)   Take  by mouth.        Diclofenac Sodium (Tablet Delayed Response) VOLTAREN 75 MG Take 75 mg by mouth 2 times a day, with meals.        Lisinopril (Tab) PRINIVIL 10 MG Take 1 Tab by mouth every day. TAKE 1 TAB BY MOUTH EVERY DAY.        Norethindrone-Eth Estradiol (Tab) ETHIN ESTRADIOL-NORETHIND ACE 1-5 MG-MCG Take 1 Tab by mouth every morning.        Omeprazole (CAPSULE DELAYED RELEASE) PRILOSEC 20 MG Take 1 Cap by mouth every day.        PARoxetine HCl (Tab) PAXIL 40 MG Take 1 Tab by mouth every morning. Indications: Generalized Anxiety Disorder        .                 Medicines prescribed today were sent to:     [id number] - [place] - [address]    [address] [place] [address]    Phone: [phone] Fax: [phone]    Open 24 Hours?: No      Medication refill instructions:       If your prescription bottle indicates you have medication refills left, it is not necessary to call your provider’s office. Please contact your pharmacy and they will refill your medication.    If your prescription bottle indicates you do not have  any refills left, you may request refills at any time through one of the following ways: The online [employer] system (except Urgent Care), by calling your provider’s office, or by asking your pharmacy to contact your provider’s office with a refill request. Medication refills are processed only during regular business hours and may not be available until the next business day. Your provider may request additional information or to have a follow-up visit with you prior to refilling your medication.   *Please Note: Medication refills are assigned a new Rx number when refilled electronically. Your pharmacy may indicate that no refills were authorized even though a new prescription for the same medication is available at the pharmacy. Please request the medicine by name with the pharmacy before contacting your provider for a refill.           [employer] Access Code: Activation code not generated  Current [employer] Status: Active

## 2017-05-22 NOTE — ASSESSMENT & PLAN NOTE
increased lisinopril from 5mg qd to 10mg qd   Home BP has been around 130's/70's  Diet: no table salt, healthy diet  Exercise: 2 time weeks.   etoh- 1 glass nightly.   Tobacco- none.   No fhx of early heart disease  Denies HA, blurry vision, CP, SOB, dizziness, light headedness, leg swelling

## 2017-05-22 NOTE — PROGRESS NOTES
Subjective:     Chief Complaint   Patient presents with   • Follow-Up     med review     Lovely Palmer is a 62 y.o. female here today for anxiety and BP as listed below    Anxiety  increase Paxil from 20 mg daily to 40 mg daily  Has been taking paxil since 1986.   Flaring slightly since her  are in the middle of splitting.   They are looking for another house trying to sell their house prior to being able to split. It is taking a long time to sell the house, just had someone back out last minute.   Does feel a little better but stomach still turns occasionally and can't relax sometimes.   Stated as soon as she is in a better situation she knows she will feel better.   Has seen counselor around 5 years ago. Will be trying to see one again but hasn't sone this yet.    Caffeine: 2 cups daily, no sidas, no energy drinks.   Diet: healthier diet    Exercise: 2 times weekly although will increase again since no longer packing.   etoh- 1 nightly   Drug use: none   PHQ9 score decreased from 7 down to 4, denies SI/SH.     LILY-7  Over the last 2 weeks, on how many days have you been bothered by the following problems?  1. Feeling nervous, anxious or on edge  2  2. Not being able stop or control worry  2  3. Worrying too much about different things  0  4. Trouble relaxing  0  5. Being so restless it is hard to sit still  0  6. Becoming easily annoyed or irritable  0  7. Feeling afraid as if something awful might happen 0  Total= went from 7 to 4    Essential hypertension  increased lisinopril from 5mg qd to 10mg qd   Home BP has been around 130's/70's  Diet: no table salt, healthy diet  Exercise: 2 time weeks.   etoh- 1 glass nightly.   Tobacco- none.   No fhx of early heart disease  Denies HA, blurry vision, CP, SOB, dizziness, light headedness, leg swelling       Current medicines (including changes today)  Current Outpatient Prescriptions   Medication Sig Dispense Refill   • alprazolam (XANAX) 0.5 MG Tab Take 1 Tab  "by mouth at bedtime as needed for Sleep or Anxiety. 5 Tab 1   • diclofenac EC (VOLTAREN) 75 MG Tablet Delayed Response Take 75 mg by mouth 2 times a day, with meals.  5   • lisinopril (PRINIVIL) 10 MG Tab Take 1 Tab by mouth every day. TAKE 1 TAB BY MOUTH EVERY DAY. 90 Tab 3   • paroxetine (PAXIL) 40 MG tablet Take 1 Tab by mouth every morning. Indications: Generalized Anxiety Disorder 90 Tab 1   • omeprazole (PRILOSEC) 20 MG delayed-release capsule Take 1 Cap by mouth every day. 30 Cap 4   • Calcium Acetate, Phos Binder, (CALCIUM ACETATE PO) Take  by mouth.     • ETHIN ESTRADIOL-NORETHIND ACE (JINTELI) 1-5 MG-MCG TABS Take 1 Tab by mouth every morning.       No current facility-administered medications for this visit.     She  has a past medical history of Anxiety (10/30/2014); Bilateral hand numbness (10/30/2014); GERD (gastroesophageal reflux disease) (10/30/2014); Hiatus hernia syndrome; and Pain (08-04-15).    ROS   No chest pain, no shortness of breath, no abdominal pain       Objective:     Blood pressure 130/70, pulse 109, temperature 37.3 °C (99.1 °F), resp. rate 16, height 1.549 m (5' 0.98\"), weight 58 kg (127 lb 13.9 oz), SpO2 96 %, not currently breastfeeding. Body mass index is 24.17 kg/(m^2).   Physical Exam:  Alert, oriented in no acute distress.  Eye contact is good, speech goal directed, affect calm  HEENT: conjunctiva non-injected, sclera non-icteric, PERRL.  Neck No adenopathy or masses in the neck or supraclavicular regions.  Lungs: clear to auscultation bilaterally with good excursion.  CV: regular rate and rhythm.  Abdomen: soft, nontender, no HSM, No CVAT  Ext: no edema, color normal, peripheral pulses 2+, temperature normal    Assessment and Plan:   The following treatment plan was discussed     1. Anxiety  Improved although still present and has random bad days.   Will continue with paxil 40mg qd.   Continue working on increasing exercise and keeping caffeine intake down, would like her to " find another counselor.  Also will add very rare use of xanax 0.5mg, she has taken this in past and did help. Discussed she will need to decreasing drinking since can not drink or drive with this medication.   Discussed addictive qualities of the medication.   Pt was okay with this and will use very rarely.   - alprazolam (XANAX) 0.5 MG Tab; Take 1 Tab by mouth at bedtime as needed for Sleep or Anxiety.  Dispense: 5 Tab; Refill: 1    2. Essential hypertension  Controlled, will continue with current regimen.       Followup: Return 3-6 mo, for anxiety and other chronic concerns.           Please note that this dictation was created using voice recognition software. I have made every reasonable attempt to correct obvious errors, but I expect that there are errors of grammar and possibly content that I did not discover before finalizing the note.

## 2017-06-20 DIAGNOSIS — K21.9 GASTROESOPHAGEAL REFLUX DISEASE, ESOPHAGITIS PRESENCE NOT SPECIFIED: ICD-10-CM

## 2017-06-20 RX ORDER — OMEPRAZOLE 20 MG/1
20 CAPSULE, DELAYED RELEASE ORAL DAILY
Qty: 90 CAP | Refills: 3 | Status: SHIPPED | OUTPATIENT
Start: 2017-06-20 | End: 2021-11-26

## 2017-06-20 NOTE — TELEPHONE ENCOUNTER
Was the patient seen in the last year in this department? Yes     Does patient have an active prescription for medications requested? Yes     Received Request Via: Pharmacy-90 day requesting    Last Visit: 5/22/17    Last Labs: 5/12/17

## 2017-07-20 NOTE — TELEPHONE ENCOUNTER
Was the patient seen in the last year in this department? Yes     Does patient have an active prescription for medications requested? Yes     Received Request Via: Pharmacy     Last office visit: 05/22/2017  Last labs: 05/12/2017

## 2017-07-21 RX ORDER — ALPRAZOLAM 0.5 MG/1
TABLET ORAL
Qty: 5 TAB | Refills: 0 | Status: SHIPPED | OUTPATIENT
Start: 2017-07-21 | End: 2017-08-14 | Stop reason: SDUPTHER

## 2017-07-29 ENCOUNTER — OFFICE VISIT (OUTPATIENT)
Dept: URGENT CARE | Facility: CLINIC | Age: 62
End: 2017-07-29
Payer: COMMERCIAL

## 2017-07-29 VITALS
HEIGHT: 61 IN | SYSTOLIC BLOOD PRESSURE: 140 MMHG | OXYGEN SATURATION: 98 % | WEIGHT: 130 LBS | DIASTOLIC BLOOD PRESSURE: 100 MMHG | HEART RATE: 116 BPM | TEMPERATURE: 98.6 F | RESPIRATION RATE: 18 BRPM | BODY MASS INDEX: 24.55 KG/M2

## 2017-07-29 DIAGNOSIS — S56.912A STRAIN OF LEFT FOREARM, INITIAL ENCOUNTER: ICD-10-CM

## 2017-07-29 DIAGNOSIS — S16.1XXA NECK STRAIN, INITIAL ENCOUNTER: ICD-10-CM

## 2017-07-29 PROCEDURE — 99214 OFFICE O/P EST MOD 30 MIN: CPT | Performed by: NURSE PRACTITIONER

## 2017-07-29 RX ORDER — CYCLOBENZAPRINE HCL 10 MG
10 TABLET ORAL
Qty: 20 TAB | Refills: 0 | Status: SHIPPED | OUTPATIENT
Start: 2017-07-29 | End: 2019-02-28

## 2017-07-29 ASSESSMENT — ENCOUNTER SYMPTOMS
SENSORY CHANGE: 1
CHILLS: 0
BACK PAIN: 0
NECK PAIN: 1
MYALGIAS: 1
FOCAL WEAKNESS: 0
TINGLING: 1
NUMBNESS: 1
FEVER: 0

## 2017-07-29 ASSESSMENT — PAIN SCALES - GENERAL: PAINLEVEL: 8=MODERATE-SEVERE PAIN

## 2017-07-29 NOTE — MR AVS SNAPSHOT
"        Lovely Palmer   2017 9:30 AM   Office Visit   MRN: [id number]    Department:  [facility]   Dept Phone:  [phone]    Description:  Female : 1955   Provider:  ODESSA PerazaPLUCIA.           Reason for Visit     Arm Pain picked up a heavy box and felt something must tore and now it is in neck and can not turn head, happened about 1.5 week but the neck began last night       Allergies as of 2017     No Known Allergies      You were diagnosed with     Neck strain, initial encounter   [492179]       Strain of left forearm, initial encounter   [527217]         Vital Signs     Blood Pressure Pulse Temperature Respirations Height Weight    140/100 mmHg 116 37 °C (98.6 °F) 18 1.549 m (5' 0.98\") 58.968 kg (130 lb)    Body Mass Index Oxygen Saturation Breastfeeding? 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   2013 - Present    Anxiety F41.9   10/30/2014 - Present    Bilateral hand numbness R20.0   10/30/2014 - Present    GERD (gastroesophageal reflux disease) K21.9   10/30/2014 - Present    Abdominal cramping R10.9   2015 - Present    Chronic bilateral low back pain without sciatica M54.5, G89.29   2015 - Present    Essential hypertension I10   2015 - Present    Preventative health care Z00.00   2017 - Present      Health Maintenance        Date Due Completion Dates    IMM DTaP/Tdap/Td Vaccine (1 - Tdap) 1974 ---    IMM ZOSTER VACCINE 2015 ---    PAP SMEAR 2016 (Prv Comp)    Override on 2013: Previously completed    IMM INFLUENZA (1) 2017 10/1/2011    MAMMOGRAM 2/15/2018 2/15/2017, 2015, 2014, 2013, 2012, 2012, 2011, 2010, " 4/6/2010, 4/2/2009, 3/16/2009, 3/16/2009, 3/12/2008, 3/12/2008, 3/12/2007    COLONOSCOPY 1/30/2021 1/30/2011 (Prv Comp)    Override on 1/30/2011: Previously completed (10 year)            Current Immunizations     Influenza TIV (IM) 10/1/2011      Below and/or attached are the medications your provider expects you to take. Review all of your home medications and newly ordered medications with your provider and/or pharmacist. Follow medication instructions as directed by your provider and/or pharmacist. Please keep your medication list with you and share with your provider. Update the information when medications are discontinued, doses are changed, or new medications (including over-the-counter products) are added; and carry medication information at all times in the event of emergency situations     Allergies:  No Known Allergies          Medications  Valid as of: July 29, 2017 - 11:57 AM    Generic Name Brand Name Tablet Size Instructions for use    ALPRAZolam (Tab) XANAX 0.5 MG TAKE 1 TABLET BY MOUTH AT BEDTIME AS NEEDED FOR SLEEP OR ANXIETY        Calcium Acetate (Phos Binder)   Take  by mouth.        Cyclobenzaprine HCl (Tab) FLEXERIL 10 MG Take 1 Tab by mouth every bedtime.        Diclofenac Sodium (Tablet Delayed Response) VOLTAREN 75 MG Take 75 mg by mouth 2 times a day, with meals.        Diclofenac Sodium (Gel) Diclofenac Sodium 1 % Apply 1 Application to skin as directed 4 times a day.        Lisinopril (Tab) PRINIVIL 10 MG Take 1 Tab by mouth every day. TAKE 1 TAB BY MOUTH EVERY DAY.        Norethindrone-Eth Estradiol (Tab) ETHIN ESTRADIOL-NORETHIND ACE 1-5 MG-MCG Take 1 Tab by mouth every morning.        Omeprazole (CAPSULE DELAYED RELEASE) PRILOSEC 20 MG Take 1 Cap by mouth every day.        PARoxetine HCl (Tab) PAXIL 40 MG Take 1 Tab by mouth every morning. Indications: Generalized Anxiety Disorder        .                 Medicines prescribed today were sent to:     [id number] - [place]  [address]    [address] [place] [address]    Phone: [phone] Fax: [phone]    Open 24 Hours?: No      Medication refill instructions:       If your prescription bottle indicates you have medication refills left, it is not necessary to call your provider’s office. Please contact your pharmacy and they will refill your medication.    If your prescription bottle indicates you do not have any refills left, you may request refills at any time through one of the following ways: The online [employer] system (except Urgent Care), by calling your provider’s office, or by asking your pharmacy to contact your provider’s office with a refill request. Medication refills are processed only during regular business hours and may not be available until the next business day. Your provider may request additional information or to have a follow-up visit with you prior to refilling your medication.   *Please Note: Medication refills are assigned a new Rx number when refilled electronically. Your pharmacy may indicate that no refills were authorized even though a new prescription for the same medication is available at the pharmacy. Please request the medicine by name with the pharmacy before contacting your provider for a refill.           [employer] Access Code: Activation code not generated  Current [employer] Status: Active

## 2017-07-29 NOTE — PROGRESS NOTES
"Subjective:      Lovely Palmer is a 62 y.o. female who presents with Arm Pain            Arm Pain   The incident occurred more than 1 week ago. The incident occurred at home. The injury mechanism is unknown. The pain is present in the left forearm. The quality of the pain is described as aching. The pain radiates to the left neck. The pain is at a severity of 8/10. Associated symptoms include numbness and tingling. She has tried NSAIDs and heat for the symptoms. The treatment provided mild relief.     Allergies, medications and history reviewed by me today    Review of Systems   Constitutional: Negative for fever and chills.   Musculoskeletal: Positive for myalgias and neck pain. Negative for back pain and joint pain.   Skin: Negative for itching and rash.   Neurological: Positive for tingling, sensory change and numbness. Negative for focal weakness.          Objective:     /100 mmHg  Pulse 116  Temp(Src) 37 °C (98.6 °F)  Resp 18  Ht 1.549 m (5' 0.98\")  Wt 58.968 kg (130 lb)  BMI 24.58 kg/m2  SpO2 98%  Breastfeeding? No     Physical Exam   Constitutional: She is oriented to person, place, and time. She appears well-developed and well-nourished. No distress.   Neck:       Cardiovascular: Normal rate, regular rhythm and normal heart sounds.    No murmur heard.  Pulmonary/Chest: Effort normal and breath sounds normal. No respiratory distress.   Musculoskeletal:        Cervical back: She exhibits decreased range of motion, tenderness and pain. She exhibits no bony tenderness, no swelling and no spasm.        Back:    Neurological: She is alert and oriented to person, place, and time.   Skin: Skin is warm and dry.   Nursing note and vitals reviewed.              Assessment/Plan:     1. Neck strain, initial encounter  cyclobenzaprine (FLEXERIL) 10 MG Tab    Diclofenac Sodium (VOLTAREN) 1 % Gel   2. Strain of left forearm, initial encounter       Sedation precautions for flexeril given to patient.  Ice to " area 10 -15 minutes every couple of hours.  Differential diagnosis, natural history, supportive care, and indications for immediate follow-up discussed at length.

## 2017-08-14 RX ORDER — ALPRAZOLAM 0.5 MG/1
TABLET ORAL
Qty: 5 TAB | Refills: 0 | Status: SHIPPED | OUTPATIENT
Start: 2017-08-14 | End: 2017-08-30 | Stop reason: SDUPTHER

## 2017-08-30 RX ORDER — ALPRAZOLAM 0.5 MG/1
TABLET ORAL
Qty: 5 TAB | Refills: 0 | Status: SHIPPED | OUTPATIENT
Start: 2017-08-30 | End: 2017-09-19 | Stop reason: SDUPTHER

## 2017-08-30 NOTE — TELEPHONE ENCOUNTER
Was the patient seen in the last year in this department? Yes     Does patient have an active prescription for medications requested? Yes     Received Request Via: Pharmacy     Last office visit: 05/22/2017  Last labs: 05/30/2017

## 2017-09-19 ENCOUNTER — OFFICE VISIT (OUTPATIENT)
Dept: MEDICAL GROUP | Facility: MEDICAL CENTER | Age: 62
End: 2017-09-19
Payer: COMMERCIAL

## 2017-09-19 VITALS
TEMPERATURE: 98.4 F | HEART RATE: 74 BPM | RESPIRATION RATE: 16 BRPM | DIASTOLIC BLOOD PRESSURE: 78 MMHG | BODY MASS INDEX: 23 KG/M2 | HEIGHT: 61 IN | SYSTOLIC BLOOD PRESSURE: 120 MMHG | WEIGHT: 121.8 LBS | OXYGEN SATURATION: 95 %

## 2017-09-19 DIAGNOSIS — S05.12XA ECCHYMOSIS OF LEFT EYE: ICD-10-CM
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
## 2017-09-19 DIAGNOSIS — F41.9 ANXIETY: ICD-10-CM
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
## 2017-09-19 DIAGNOSIS — S40.022A CONTUSION OF LEFT UPPER EXTREMITY, INITIAL ENCOUNTER: ICD-10-CM

## 2017-09-19 PROBLEM — R55 SYNCOPE: Status: ACTIVE | Noted: 2017-09-19

## 2017-09-19 PROCEDURE — 99214 OFFICE O/P EST MOD 30 MIN: CPT | Performed by: PHYSICIAN ASSISTANT

## 2017-09-19 RX ORDER — ALPRAZOLAM 0.5 MG/1
0.5 TABLET ORAL NIGHTLY PRN
Qty: 30 TAB | Refills: 0 | Status: SHIPPED | OUTPATIENT
Start: 2017-09-19 | End: 2017-12-01 | Stop reason: SDUPTHER

## 2017-09-20 NOTE — ASSESSMENT & PLAN NOTE
Her anxiety and depression has become worse over the past few weeks. She is still dealing with her 's current extramarital affair. She is currently  from her . She still is taking Paxil. She was provided a short course of Xanax at 0.5 mg but has been off that medication. Is requesting a longer supply as she takes one every 3 days. Denies any homicidal or suicidal ideations.

## 2017-09-20 NOTE — PROGRESS NOTES
Subjective:   Lovely Palmer is a 62 y.o. female here today for fall at home causing a left black eye and left arm bruising since Friday.    Fall at home  This is a 62-year-old female who on Friday was at home when she got up from her couch and slipped on her floor. She was wearing socks. She had the left side of her face and her eye on the kitchen counter and believes she may have blacked out. When she woke up she had lots of blood around her. She also developed some bruising on her left arm. She was alone so doesn't know how long she blacked out for. She denies any headache currently. She denies any nausea or vomiting. No significant arm pain or facial pain. Her physical therapist advised that she come in and be seen.    Anxiety  Her anxiety and depression has become worse over the past few weeks. She is still dealing with her 's current extramarital affair. She is currently  from her . She still is taking Paxil. She was provided a short course of Xanax at 0.5 mg but has been off that medication. Is requesting a longer supply as she takes one every 3 days. Denies any homicidal or suicidal ideations.       Current medicines (including changes today)  Current Outpatient Prescriptions   Medication Sig Dispense Refill   • alprazolam (XANAX) 0.5 MG Tab Take 1 Tab by mouth at bedtime as needed for Anxiety. 30 Tab 0   • cyclobenzaprine (FLEXERIL) 10 MG Tab Take 1 Tab by mouth every bedtime. 20 Tab 0   • Diclofenac Sodium (VOLTAREN) 1 % Gel Apply 1 Application to skin as directed 4 times a day. 1 Tube 0   • omeprazole (PRILOSEC) 20 MG delayed-release capsule Take 1 Cap by mouth every day. 90 Cap 3   • diclofenac EC (VOLTAREN) 75 MG Tablet Delayed Response Take 75 mg by mouth 2 times a day, with meals.  5   • lisinopril (PRINIVIL) 10 MG Tab Take 1 Tab by mouth every day. TAKE 1 TAB BY MOUTH EVERY DAY. 90 Tab 3   • paroxetine (PAXIL) 40 MG tablet Take 1 Tab by mouth every morning. Indications:  "Generalized Anxiety Disorder 90 Tab 1   • Calcium Acetate, Phos Binder, (CALCIUM ACETATE PO) Take  by mouth.     • ETHIN ESTRADIOL-NORETHIND ACE (JINTELI) 1-5 MG-MCG TABS Take 1 Tab by mouth every morning.       No current facility-administered medications for this visit.      She  has a past medical history of Anxiety (10/30/2014); Bilateral hand numbness (10/30/2014); GERD (gastroesophageal reflux disease) (10/30/2014); Hiatus hernia syndrome; and Pain (08-04-15).    ROS   No chest pain, no shortness of breath, no abdominal pain and all other systems were reviewed and are negative.       Objective:     Blood pressure 120/78, pulse 74, temperature 36.9 °C (98.4 °F), resp. rate 16, height 1.549 m (5' 0.98\"), weight 55.2 kg (121 lb 12.8 oz), SpO2 95 %. Body mass index is 23.03 kg/m².   Physical Exam:  Constitutional: Alert, no distress.  Skin: Warm, dry, good turgor, no rashes in visible areas. Ecchymosis of moderate extent around the left eye. There is some superficial abrasions. There is tenderness around the eye socket. Left arm with significant ecchymosis.  Eye: Equal, round and reactive, conjunctiva clear, lids normal.  ENMT: Lips without lesions, good dentition, oropharynx clear. TMs bilaterally are clear. No Stuart sign.  Neck: Trachea midline, no masses.   Lymph: No cervical or supraclavicular lymphadenopathy  Respiratory: Unlabored respiratory effort, lungs clear to auscultation, no wheezes, no ronchi.  Cardiovascular: Normal S1, S2, no murmur, no edema.  Musculoskeletal: Upper arm extremity range of motion is good. Muscle strength is 5 out of 5.  Psych: Alert and oriented x3, normal affect and mood.        Assessment and Plan:   The following treatment plan was discussed    1. Fall at home, initial encounter  Acute, new onset condition. Discussed with safety at home.    2. Ecchymosis of left eye  Acute, new onset condition status post fall. No significant damage thought today. No imaging performed. Advised " may take nonsteroidals as directed. Contact me or return with any worsening symptoms.    3. Contusion of left upper extremity, initial encounter  Acute, new onset condition status post fall. May take weeks for bruising the heal.    4. Anxiety  Chronic condition with recent exacerbation. Tinea Paxil daily. Prescribed Xanax 0.5 mg as needed for anxiety. Contact me if she needs refills.   -alprazolam (XANAX) 0.5 MG Tab; Take 1 Tab by mouth at bedtime as needed for Anxiety.  Dispense: 30 Tab; Refill: 0      Followup: Return if symptoms worsen or fail to improve.    Please note that this dictation was created using voice recognition software. I have made every reasonable attempt to correct obvious errors, but I expect that there are errors of grammar and possibly content that I did not discover before finalizing the note.

## 2017-09-20 NOTE — ASSESSMENT & PLAN NOTE
This is a 62-year-old female who on Friday was at home when she got up from her couch and slipped on her floor. She was wearing socks. She had the left side of her face and her eye on the kitchen counter and believes she may have blacked out. When she woke up she had lots of blood around her. She also developed some bruising on her left arm. She was alone so doesn't know how long she blacked out for. She denies any headache currently. She denies any nausea or vomiting. No significant arm pain or facial pain. Her physical therapist advised that she come in and be seen.

## 2017-10-09 ENCOUNTER — OFFICE VISIT (OUTPATIENT)
Dept: BEHAVIORAL HEALTH | Facility: PHYSICIAN GROUP | Age: 62
End: 2017-10-09
Payer: COMMERCIAL

## 2017-10-09 DIAGNOSIS — F43.21 ADJUSTMENT DISORDER WITH DEPRESSED MOOD: ICD-10-CM

## 2017-10-09 DIAGNOSIS — F41.1 GAD (GENERALIZED ANXIETY DISORDER): ICD-10-CM

## 2017-10-09 PROCEDURE — 90791 PSYCH DIAGNOSTIC EVALUATION: CPT | Performed by: MARRIAGE & FAMILY THERAPIST

## 2017-10-09 NOTE — BH THERAPY
[facility]  INITIAL ASSESSMENT    Name: Lovely Palmer  MRN: [id number]  : 1955  Age: 62 y.o.  Date of assessment: 10/9/2017  PCP: Zoë Rashid P.A.-C.  Persons in attendance: Patient  Total session time: 45 minutes      CHIEF COMPLAINT AND HISTORY OF PRESENTING PROBLEM:  (as stated by Patient):  Lovely Palmer is a 62 y.o., White female referred for assessment by No ref. provider found.  Primary presenting issue includes   Chief Complaint   Patient presents with   • Depression   • Anxiety   .     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 and pt time as ; manoj in [employer] in [place]; mom  of serge aneurysm in her kitchen; not close to fa, who was an alcoholic  Current family/social stressors: eriberto is having his second affair (1st was 6 yrs ago and she kicked him out for a yr) w/ a 22 yo; eriberto is 61 and is alcoholic and opiate addict, family did intervention but he refused tx; they sold house in July and she lives alone; manoj won't have anything to do w/ her father; son barely talks to him; they own business in common, 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 Yes  Family History   Problem Relation Age of Onset   • Cancer Father    • Alcohol abuse Father    • Stroke          BEHAVIORAL HEALTH TREATMENT HISTORY  Does patient/parent report a history of prior behavioral health treatment for patient? Yes:    Dates Level of Care Facilty/Provider Diagnosis/Problem Medications   When mom  and when eriberto had first affair op various  Grief, etc none                                                                          History of untreated behavioral health issues identified? No    MEDICAL HISTORY  Primary care behavioral health screenings: Patient Health Questionaire                                     If depressive symptoms identified deferred to follow up visit unless specifically addressed in assesment and plan.    Interpretation of PHQ-9 Total Score   Score Severity   1-4 No Depression   5-9 Mild Depression   10-14 Moderate Depression   15-19 Moderately Severe Depression   20-27 Severe Depression       Past medical/surgical history: Past Medical History:   Diagnosis Date   • Pain 08-04-15    lower back, 2/10   • Anxiety 10/30/2014   • Bilateral hand numbness 10/30/2014   • GERD (gastroesophageal reflux disease) 10/30/2014   • Hiatus hernia syndrome       Past Surgical History:   Procedure Laterality Date   • LUMBAR LAMINECTOMY DISKECTOMY Right 2015    Procedure: LUMBAR LAMINECTOMY DISKECTOMY LEIGH- L4-5;  Surgeon: Shay Silver M.D.;  Location: SURGERY [facility];  Service:    • HIP ARTHROPLASTY TOTAL  2013    Performed by Luis M Sequeira M.D. at SURGERY [facility]   • PB ENLARGE BREAST WITH IMPLANT     • PB  DELIVERY ONLY     • PRIMARY C SECTION          Medication Allergies:  Review of patient's allergies indicates no known allergies.   Medical history provided by patient during current evaluation: not relevant    Patient reports last physical exam: 4 mos ago  Does patient/parent report any history of or current developmental concerns? No  Does patient/parent report nutritional concerns? No  Does patient/parent report change in appetite or weight loss/gain? Yes lost 10# when first   Does patient/parent report history of eating disorder symptoms? No  Does patient/parent report dental problem? No  Does patient/parent report physical pain? No   Indicate if pain is acute or chronic, and  location: na   Pain scale rating:       Does patient/parent report functional impact of medical, developmental, or pain issues?   N\A    EDUCATIONAL/LEARNING HISTORY  Is patient currently enrolled in a school/educational program?   No:   Highest grade level completed: 12  School performance/functioning: ok  History of Special Education/repeated grades/learning issues: no  Preferred learning style: doing  Current learning needs (large print, language barrier, etc):  na      EMPLOYMENT/RESOURCES  Is the patient currently employed? 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 Yes  Does patient identify impact of presenting issue on work functioning? No  Work or income-related stressors:  Income may be affected by [employer] spending on gf     HISTORY:  Does patient report current or past enlistment? No    [If yes, complete below items]  Does patient report history of exposure to combat? No  Does patient report history of  sexual trauma? No  Does patient report other -related stressors? no    SPIRITUAL/CULTURAL/IDENTITY:  What are the patient’s/family’s spiritual beliefs or practices? na  What is the patient’s cultural or ethnic background/identity? cauc  How does the patient identify their sexual orientation? het  How does the patient identify their gender? female  Does the patient identify any spiritual/cultural/identity factors as relevant to the presenting issue? No    LEGAL HISTORY  Has the patient ever been involved with juvenile, adult, or family legal systems? No   [If yes, trigger section below:]  Does patient report ever being a victim of a crime?  No  Does patient report involvement in any current legal issues?  No  Does patient report ever being arrested or committing a crime? No  Does patient report any current agency (parole/probation/CPS/) involvement? No    ABUSE/NEGLECT/TRAUMA SCREENING  Does patient report feeling  “unsafe” in his/her home, or afraid of anyone? No  Does patient report any history of physical, sexual, or emotional abuse? No  Does parent or significant other report any of the above? na  Is there evidence of neglect by self? No  Is there evidence of neglect by a caregiver? No  Does the patient/parent report any history of CPS/APS/police involvement related to suspected abuse/neglect or domestic violence? No  Does the patient/parent report any other history of potentially traumatic life events? No  Based on the information provided during the current assessment, is a mandated report of suspected abuse/neglect being made?  No     SAFETY ASSESSMENT - SELF  Does patient acknowledge current or past symptoms of dangerousness to self? No  Does parent/significant other report patient has current or past symptoms of dangerousness to self? na      Recent change in frequency/specificity/intensity of suicidal thoughts or self-harm behavior? No  Current access to firearms, medications, or other identified means of suicide/self-harm? No  If yes, willing to restrict access to means of suicide/self-harm? na  Protective factors present: Good impulse control, Moral objection to suicide and Positive self-efficacy    Current Suicide Risk: Low  Crisis Safety Plan completed and copy given to patient: No    SAFETY ASSESSMENT - OTHERS  Does paor past symptoms of aggressive behavior or risk to others? No  Does parent/significant othtient acknowledge current or past symptoms of aggressive behavior or risk to others? na  Does parent/significant other report patient has current or past symptoms of aggressive behavior or risk to others? na    Recent change in frequency/specificity/intensity of thoughts or threats to harm others? No  Current access to firearms/other identified means of harm? No  If yes, willing to restrict access to weapons/means of harm? na  Protective factors present: Good frustration tolerance, Well-developed sense of  empathy and Positive impulse-control    Current Homicide Risk:  Not applicable  Crisis Safety Plan completed and copy given to patient? No  Based on information provided during the current assessment, is a mandated “duty to warn” being exercised? No    SUBSTANCE USE/ADDICTION HISTORY  [] Not applicable - patient 10 years of age or younger    Is there a family history of substance use/addiction? Yes  Does patient acknowledge or parent/significant other report use of/dependence on substances? Yes  Last time patient used alcohol: wkend  Within the past week? Yes  2 drinks 2x wk  Last time patient used marijuana: na  Within the past month? No  Any other street drugs ever tried even once? No  Any use of prescription medications/pills without a prescription, or for reasons others than originally prescribed?  No  Any other addictive behavior reported (gambling, shopping, sex)? No     Drug History:  Amphetamine:Amphetamine frequency: Never used      Cannibis:  Cannabis frequency: Never used      Cocaine:  Cocaine frequency: Never used      Ecstasy:  Ecstasy frequency: Never used      Hallucinogen:  Hallucinogen frequency: Never used      Inhalant:   Inhalant frequency: Never used      Opiate:  Opiate frequency: Never used  Cannabis frequency: Never used      Other:  Other drug frequency: Never used      Sedative:   Sedative frequency: Never used          What consequences does the patient associate with any of the above substance use and or addictive behaviors? None    Patient’s motivation/readiness for change: na    [] Patient denies use of any substance/addictive behaviors    STRENGTHS/ASSETS  Strengths Identified by interviewer: Insight into problems, Self-awareness, Family suppport, Effeectively addressed past stressors/challenges and Cognitive flexibility  Strengths Identified by patient: willingness to get help    MENTAL STATUS/OBSERVATIONS   Participation: Active verbal participation  Grooming:  Neat  Orientation:Alert   Behavior: Calm  Eye contact: Good   Mood:Depressed and Anxious  Affect:Sad, Anxious and Tearful  Thought process: Logical  Thought content:  Within normal limits  Speech: Rate within normal limits  Perception: Within normal limits  Memory: No gross evidence of memory deficits  Insight: Good  Judgment:  Good  Other:    Family/couple interaction observations: na    RESULTS OF SCREENING MEASURES:  [] Not applicable  Measure:   Score:     Measure:   Score:       CLINICAL FORMULATION: 63 yo Lovely sad about current situation w/ husb of 29 yrs having an affair as well as drinking all day and addicted to percoset; she's worried about him and also angry w/ him; saw an atty but is unsure whether she wants to proceed w/ divorce/splitting assets; says she's doing better than at first but aware she's grieving and that it's a process; has 4 chihuauas that help; part of her would like to reconcile but trust is issue as is his addictions; wants a place to be able to talk      DIAGNOSTIC IMPRESSION(S):  1. LILY (generalized anxiety disorder)    2. Adjustment disorder with depressed mood          IDENTIFIED NEEDS/PLAN:  [If any of these marked, trigger DISPOSITION list]  Mood/anxiety and Family/Couples conflict  Actively being addressed by [facility]    Does patient express agreement with the above plan? Yes     Referral appointment(s) scheduled? w/ this therapist       CASSANDRA Shannon.

## 2017-10-19 ENCOUNTER — OFFICE VISIT (OUTPATIENT)
Dept: BEHAVIORAL HEALTH | Facility: PHYSICIAN GROUP | Age: 62
End: 2017-10-19
Payer: COMMERCIAL

## 2017-10-19 DIAGNOSIS — F43.21 ADJUSTMENT DISORDER WITH DEPRESSED MOOD: ICD-10-CM

## 2017-10-19 PROCEDURE — 90834 PSYTX W PT 45 MINUTES: CPT | Performed by: MARRIAGE & FAMILY THERAPIST

## 2017-10-19 NOTE — BH THERAPY
[facility]  Therapy Progress Note    Patient Name: Lovely Palmer  Patient MRN: [id number]  Today's Date: 10/19/2017     Type of session:Individual psychotherapy  Length of session: 45 minutes  Persons in attendance:Patient    Subjective/New Info: doing s/w better as she's realizing that she has at least 10 mo (the length of her home lease) to make a decision; Fernando hasn't been in contact w/ the other woman and has been texting/calling her multiple times daily, telling her how much he wants her back and how sad he is; she tells him to tell it to his therapist; says she's okay w/ some of it, but able to set limits    Objective/Observations:   Participation: Active verbal participation   Grooming: Neat   Cognition: Alert   Eye contact: Good   Mood: Depressed   Affect: Sad   Thought process: Logical   Speech: Rate within normal limits   Other:     Diagnoses:   1. Adjustment disorder with depressed mood         Current risk:   SUICIDE: Low   Homicide: Not applicable   Self-harm: Not applicable   Relapse: Not applicable   Other:    Safety Plan reviewed? Not Indicated   If evidence of imminent risk is present, intervention/plan:     Therapeutic Intervention(s): Communication skills, Conflict clarification, Distress tolerance skills, Limit-setting, Positive behavior reinforced, Self-care skills and Supportive psychotherapy    Treatment Goal(s)/Objective(s) addressed: using this time to learn about herself; unsure if she's even willing to reconcile; says he needs to really show her he's willing to change and continue w/ that     Progress toward Treatment Goals: Moderate improvement    Plan:  - Next appointment scheduled:  11/9/2017    KAYLAH Shannon  10/19/2017

## 2017-10-22 DIAGNOSIS — F41.9 ANXIETY: ICD-10-CM

## 2017-10-23 RX ORDER — PAROXETINE HYDROCHLORIDE 40 MG/1
40 TABLET, FILM COATED ORAL EVERY MORNING
Qty: 90 TAB | Refills: 0 | Status: SHIPPED | OUTPATIENT
Start: 2017-10-23 | End: 2018-01-18 | Stop reason: SDUPTHER

## 2017-10-23 NOTE — TELEPHONE ENCOUNTER
Was the patient seen in the last year in this department? Yes     Does patient have an active prescription for medications requested? No     Received Request Via: Pharmacy     Last Visit: 9/19/17    Last Labs: 5/21/17

## 2017-11-09 ENCOUNTER — APPOINTMENT (OUTPATIENT)
Dept: BEHAVIORAL HEALTH | Facility: PHYSICIAN GROUP | Age: 62
End: 2017-11-09
Payer: COMMERCIAL

## 2017-12-01 ENCOUNTER — OFFICE VISIT (OUTPATIENT)
Dept: MEDICAL GROUP | Facility: LAB | Age: 62
End: 2017-12-01
Payer: COMMERCIAL

## 2017-12-01 VITALS
TEMPERATURE: 98.9 F | BODY MASS INDEX: 23.22 KG/M2 | HEIGHT: 61 IN | OXYGEN SATURATION: 95 % | SYSTOLIC BLOOD PRESSURE: 122 MMHG | DIASTOLIC BLOOD PRESSURE: 90 MMHG | HEART RATE: 97 BPM | RESPIRATION RATE: 12 BRPM | WEIGHT: 123 LBS

## 2017-12-01 DIAGNOSIS — I10 ESSENTIAL HYPERTENSION: ICD-10-CM

## 2017-12-01 DIAGNOSIS — F41.9 ANXIETY: ICD-10-CM

## 2017-12-01 PROCEDURE — 99214 OFFICE O/P EST MOD 30 MIN: CPT | Performed by: PHYSICIAN ASSISTANT

## 2017-12-01 RX ORDER — ALPRAZOLAM 0.5 MG/1
0.5 TABLET ORAL NIGHTLY PRN
Qty: 30 TAB | Refills: 2 | Status: SHIPPED | OUTPATIENT
Start: 2017-12-01 | End: 2019-02-28 | Stop reason: SDUPTHER

## 2017-12-01 ASSESSMENT — PATIENT HEALTH QUESTIONNAIRE - PHQ9
SUM OF ALL RESPONSES TO PHQ QUESTIONS 1-9: 5
9. THOUGHTS THAT YOU WOULD BE BETTER OFF DEAD, OR OF HURTING YOURSELF: 0
7. TROUBLE CONCENTRATING ON THINGS, SUCH AS READING THE NEWSPAPER OR WATCHING TELEVISION: 1
4. FEELING TIRED OR HAVING LITTLE ENERGY: 0
1. LITTLE INTEREST OR PLEASURE IN DOING THINGS: 1
5. POOR APPETITE OR OVEREATING: 0
6. FEELING BAD ABOUT YOURSELF - OR THAT YOU ARE A FAILURE OR HAVE LET YOURSELF OR YOUR FAMILY DOWN: 0
8. MOVING OR SPEAKING SO SLOWLY THAT OTHER PEOPLE COULD HAVE NOTICED. OR THE OPPOSITE, BEING SO FIGETY OR RESTLESS THAT YOU HAVE BEEN MOVING AROUND A LOT MORE THAN USUAL: 0
2. FEELING DOWN, DEPRESSED, IRRITABLE, OR HOPELESS: 2
SUM OF ALL RESPONSES TO PHQ9 QUESTIONS 1 AND 2: 3
3. TROUBLE FALLING OR STAYING ASLEEP OR SLEEPING TOO MUCH: 1

## 2017-12-01 ASSESSMENT — PAIN SCALES - GENERAL: PAINLEVEL: NO PAIN

## 2017-12-01 NOTE — ASSESSMENT & PLAN NOTE
- 09/19/2017- xanax 0.5mg #30.   Last seen 5/2017.   We increased paxil to 40mg qd and started xanax 0.5mg prn (pt takes once every few days. Helping.   She is  with . And now  has found a 24yo GF. Has been distancing himself from kids and family. 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   Has been seeing therapist once monthly- wants to go more often but [facility]  is so busy she can only get in once monthly and then she is on waiting list.    Does feel a little better with having xanax for flaring days- stomach still turns but not as much.     Caffeine: 1 cups daily, no sidas, no energy drinks.   Diet: healthier diet    Exercise: 3 times weekly   etoh- 1 weekly- does not take with xanax   Drug use: none   PHQ9 score stable at 5, denies SI/SH.     LILY-7  Over the last 2 weeks, on how many days have you been bothered by the following problems?  1. Feeling nervous, anxious or on edge  2  2. Not being able stop or control worry  2  3. Worrying too much about different things  0  4. Trouble relaxing  0  5. Being so restless it is hard to sit still  0  6. Becoming easily annoyed or irritable  0  7. Feeling afraid as if something awful might happen 0  Total=  4

## 2017-12-02 NOTE — ASSESSMENT & PLAN NOTE
This is chronic. Stable.   Monitoring BP at home. her home BP readings are on average 120's-130's/high 80's.   Today 122/90  Currently taking lisinopril 10mg qd as directed. Also taking baby aspirin.   Denies lightheadedness, vision changes, headache, palpitations, chest pain, cough or leg swelling.

## 2017-12-26 ENCOUNTER — OFFICE VISIT (OUTPATIENT)
Dept: BEHAVIORAL HEALTH | Facility: PHYSICIAN GROUP | Age: 62
End: 2017-12-26
Payer: COMMERCIAL

## 2017-12-26 DIAGNOSIS — F43.21 ADJUSTMENT DISORDER WITH DEPRESSED MOOD: ICD-10-CM

## 2017-12-26 DIAGNOSIS — F41.1 GAD (GENERALIZED ANXIETY DISORDER): ICD-10-CM

## 2017-12-26 PROCEDURE — 90834 PSYTX W PT 45 MINUTES: CPT | Performed by: MARRIAGE & FAMILY THERAPIST

## 2017-12-26 NOTE — BH THERAPY
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 Adjustment disorder with depressed mood    2. LILY (generalized anxiety disorder)         Current risk:   SUICIDE: Low   Homicide: Not applicable   Self-harm: Not applicable   Relapse: Not applicable   Other:    Safety Plan reviewed? 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

## 2018-01-08 ENCOUNTER — TELEPHONE (OUTPATIENT)
Dept: MEDICAL GROUP | Facility: LAB | Age: 63
End: 2018-01-08

## 2018-01-08 NOTE — TELEPHONE ENCOUNTER
Rx paper received from pharmacy stating not enough information on Rx since new law Past January 1.   Just needed IC 10 code F41.9  This information was given to pharmacy, scanned in lead or from pharmacy.     Please call patient and let her know this information was given and prescription should be ready for xanax.   Thank you  Zoë

## 2018-01-18 ENCOUNTER — OFFICE VISIT (OUTPATIENT)
Dept: BEHAVIORAL HEALTH | Facility: PHYSICIAN GROUP | Age: 63
End: 2018-01-18
Payer: COMMERCIAL

## 2018-01-18 DIAGNOSIS — F41.9 ANXIETY: ICD-10-CM

## 2018-01-18 DIAGNOSIS — F43.21 ADJUSTMENT DISORDER WITH DEPRESSED MOOD: ICD-10-CM

## 2018-01-18 DIAGNOSIS — F41.1 GAD (GENERALIZED ANXIETY DISORDER): ICD-10-CM

## 2018-01-18 PROCEDURE — 90834 PSYTX W PT 45 MINUTES: CPT | Performed by: MARRIAGE & FAMILY THERAPIST

## 2018-01-18 NOTE — TELEPHONE ENCOUNTER
Was the patient seen in the last year in this department? Yes     Does patient have an active prescription for medications requested? No     Received Request Via: Pharmacy     Last visit:12/1/17

## 2018-01-18 NOTE — BH THERAPY
[facility]  Therapy Progress Note    Patient Name: Lovely Palmer  Patient MRN: [id number]  Today's Date: 1/18/2018     Type of session:Individual psychotherapy  Length of session: 45 minutes  Persons in attendance:Patient    Subjective/New Info: doing better; much less anxiety; had a good time in [place] w/ sister; still hears from husb daily but only answers phone 50% of the time; has given divorce papers to atty to file; business isn't doing well    Objective/Observations:   Participation: Active verbal participation   Grooming: Neat   Cognition: Alert   Eye contact: Good   Mood: Anxious   Affect: Anxious   Thought process: Logical   Speech: Rate within normal limits   Other:     Diagnoses:   1. Adjustment disorder with depressed mood    2. LILY (generalized anxiety disorder)         Current risk:   SUICIDE: Low   Homicide: Not applicable   Self-harm: Not applicable   Relapse: Not applicable   Other:    Safety Plan reviewed? Not Indicated   If evidence of imminent risk is present, intervention/plan:     Therapeutic Intervention(s): Conflict resolution skills, Parenting/familial roles addressed and Positive behavior reinforced    Treatment Goal(s)/Objective(s) addressed: going to the gym; has made sure her finances are as safe as possible; sure she's done the right things     Progress toward Treatment Goals: Moderate improvement    Plan: continue self-care, will cancel next appt if she's feeling ok  - Next appointment scheduled:  2/1/2018    KAYLAH Shannon  1/18/2018

## 2018-01-19 RX ORDER — PAROXETINE HYDROCHLORIDE 40 MG/1
40 TABLET, FILM COATED ORAL EVERY MORNING
Qty: 90 TAB | Refills: 3 | Status: SHIPPED | OUTPATIENT
Start: 2018-01-19 | End: 2019-03-04 | Stop reason: SDUPTHER

## 2018-02-12 ENCOUNTER — HOSPITAL ENCOUNTER (OUTPATIENT)
Dept: RADIOLOGY | Facility: MEDICAL CENTER | Age: 63
End: 2018-02-12
Attending: INTERNAL MEDICINE
Payer: COMMERCIAL

## 2018-02-12 DIAGNOSIS — K52.9 INFLAMMATORY BOWEL DISEASE: ICD-10-CM

## 2018-02-12 PROCEDURE — 74250 X-RAY XM SM INT 1CNTRST STD: CPT

## 2018-02-15 ENCOUNTER — APPOINTMENT (OUTPATIENT)
Dept: BEHAVIORAL HEALTH | Facility: PHYSICIAN GROUP | Age: 63
End: 2018-02-15
Payer: COMMERCIAL

## 2018-02-20 ENCOUNTER — HOSPITAL ENCOUNTER (OUTPATIENT)
Dept: RADIOLOGY | Facility: MEDICAL CENTER | Age: 63
End: 2018-02-20
Attending: OBSTETRICS & GYNECOLOGY
Payer: COMMERCIAL

## 2018-02-20 DIAGNOSIS — Z12.31 ENCOUNTER FOR SCREENING MAMMOGRAM FOR MALIGNANT NEOPLASM OF BREAST: ICD-10-CM

## 2018-02-20 PROCEDURE — 77067 SCR MAMMO BI INCL CAD: CPT

## 2018-03-05 ENCOUNTER — APPOINTMENT (OUTPATIENT)
Dept: BEHAVIORAL HEALTH | Facility: PHYSICIAN GROUP | Age: 63
End: 2018-03-05
Payer: COMMERCIAL

## 2018-04-17 DIAGNOSIS — I10 ESSENTIAL HYPERTENSION: ICD-10-CM

## 2018-04-17 RX ORDER — LISINOPRIL 10 MG/1
TABLET ORAL
Qty: 90 TAB | Refills: 3 | Status: SHIPPED | OUTPATIENT
Start: 2018-04-17 | End: 2021-10-06 | Stop reason: SDUPTHER

## 2019-01-18 ENCOUNTER — HOSPITAL ENCOUNTER (OUTPATIENT)
Dept: RADIOLOGY | Facility: MEDICAL CENTER | Age: 64
End: 2019-01-18
Attending: NURSE PRACTITIONER
Payer: COMMERCIAL

## 2019-01-18 DIAGNOSIS — M54.50 CHRONIC LOW BACK PAIN WITHOUT SCIATICA, UNSPECIFIED BACK PAIN LATERALITY: ICD-10-CM

## 2019-01-18 DIAGNOSIS — M54.12 CERVICAL RADICULITIS: ICD-10-CM

## 2019-01-18 DIAGNOSIS — G89.29 CHRONIC LOW BACK PAIN WITHOUT SCIATICA, UNSPECIFIED BACK PAIN LATERALITY: ICD-10-CM

## 2019-01-18 DIAGNOSIS — M54.5 LOW BACK PAIN, UNSPECIFIED BACK PAIN LATERALITY, UNSPECIFIED CHRONICITY, WITH SCIATICA PRESENCE UNSPECIFIED: ICD-10-CM

## 2019-01-18 DIAGNOSIS — R41.3 OTHER AMNESIA: ICD-10-CM

## 2019-01-18 PROCEDURE — 72141 MRI NECK SPINE W/O DYE: CPT

## 2019-01-18 PROCEDURE — 72110 X-RAY EXAM L-2 SPINE 4/>VWS: CPT

## 2019-01-18 PROCEDURE — 70551 MRI BRAIN STEM W/O DYE: CPT

## 2019-01-18 PROCEDURE — 72148 MRI LUMBAR SPINE W/O DYE: CPT

## 2019-02-13 ENCOUNTER — TELEPHONE (OUTPATIENT)
Dept: MEDICAL GROUP | Facility: LAB | Age: 64
End: 2019-02-13

## 2019-02-13 NOTE — TELEPHONE ENCOUNTER
----- Message from Panchito Dominguez P.A.-C. sent at 2/12/2019  7:06 PM PST -----  Ruben Sorto,    I've been asked by management to stop with new patients.  My panel is overfilled.  Have her establish with Dr. Diana Ace.  She's great and accepting new patients.  She's in our office.  Thank you.    Sincerely,    Panchito    ----- Message -----  From: Noreen Astudillo, Med Ass't  Sent: 2/12/2019   3:04 PM  To: Panchito Dominguez P.A.-C.    A previous patient of Zoë Rashid who recently left practice.    Patient needs to establish care with a new primary care provider in the near future for follow up. Recently seen by neurosurgery.   She seen you in the past are you willing to take her on?

## 2019-02-19 ENCOUNTER — HOSPITAL ENCOUNTER (OUTPATIENT)
Dept: RADIOLOGY | Facility: MEDICAL CENTER | Age: 64
End: 2019-02-19
Attending: NEUROLOGICAL SURGERY
Payer: COMMERCIAL

## 2019-02-19 DIAGNOSIS — M54.2 CERVICALGIA: ICD-10-CM

## 2019-02-19 PROCEDURE — 72146 MRI CHEST SPINE W/O DYE: CPT

## 2019-02-22 ENCOUNTER — HOSPITAL ENCOUNTER (OUTPATIENT)
Dept: RADIOLOGY | Facility: MEDICAL CENTER | Age: 64
End: 2019-02-22
Attending: OBSTETRICS & GYNECOLOGY
Payer: COMMERCIAL

## 2019-02-22 DIAGNOSIS — Z12.31 VISIT FOR SCREENING MAMMOGRAM: ICD-10-CM

## 2019-02-22 PROCEDURE — 77063 BREAST TOMOSYNTHESIS BI: CPT

## 2019-02-28 ENCOUNTER — OFFICE VISIT (OUTPATIENT)
Dept: MEDICAL GROUP | Facility: MEDICAL CENTER | Age: 64
End: 2019-02-28
Payer: COMMERCIAL

## 2019-02-28 VITALS
HEIGHT: 61 IN | DIASTOLIC BLOOD PRESSURE: 68 MMHG | TEMPERATURE: 98.2 F | SYSTOLIC BLOOD PRESSURE: 104 MMHG | OXYGEN SATURATION: 95 % | HEART RATE: 105 BPM | WEIGHT: 136.69 LBS | BODY MASS INDEX: 25.81 KG/M2

## 2019-02-28 DIAGNOSIS — F41.9 ANXIETY: ICD-10-CM

## 2019-02-28 DIAGNOSIS — Z79.899 CONTROLLED SUBSTANCE AGREEMENT SIGNED: ICD-10-CM

## 2019-02-28 DIAGNOSIS — F43.21 ADJUSTMENT DISORDER WITH DEPRESSED MOOD: ICD-10-CM

## 2019-02-28 DIAGNOSIS — Z87.39 HISTORY OF AVASCULAR NECROSIS OF CAPITAL FEMORAL EPIPHYSIS: ICD-10-CM

## 2019-02-28 DIAGNOSIS — F41.1 GAD (GENERALIZED ANXIETY DISORDER): ICD-10-CM

## 2019-02-28 DIAGNOSIS — I10 ESSENTIAL HYPERTENSION: ICD-10-CM

## 2019-02-28 DIAGNOSIS — R10.9 ABDOMINAL CRAMPING: ICD-10-CM

## 2019-02-28 DIAGNOSIS — R20.0 BILATERAL HAND NUMBNESS: ICD-10-CM

## 2019-02-28 DIAGNOSIS — K21.9 GASTROESOPHAGEAL REFLUX DISEASE, ESOPHAGITIS PRESENCE NOT SPECIFIED: ICD-10-CM
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
## 2019-02-28 PROBLEM — S05.12XA ECCHYMOSIS OF LEFT EYE: Status: RESOLVED | Noted: 2017-09-19 | Resolved: 2019-02-28
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
## 2019-02-28 PROBLEM — S40.022A CONTUSION OF LEFT UPPER EXTREMITY: Status: RESOLVED | Noted: 2017-09-19 | Resolved: 2019-02-28

## 2019-02-28 PROBLEM — R55 SYNCOPE: Status: RESOLVED | Noted: 2017-09-19 | Resolved: 2019-02-28

## 2019-02-28 PROCEDURE — 99204 OFFICE O/P NEW MOD 45 MIN: CPT | Performed by: INTERNAL MEDICINE

## 2019-02-28 RX ORDER — AMLODIPINE BESYLATE 10 MG/1
10 TABLET ORAL
Refills: 3 | COMMUNITY
Start: 2019-02-01 | End: 2021-11-26

## 2019-02-28 RX ORDER — MELOXICAM 7.5 MG/1
7.5 TABLET ORAL
Refills: 3 | COMMUNITY
Start: 2019-02-01 | End: 2021-11-26

## 2019-02-28 RX ORDER — ALPRAZOLAM 0.5 MG/1
0.5 TABLET ORAL NIGHTLY PRN
Qty: 30 TAB | Refills: 0 | Status: SHIPPED | OUTPATIENT
Start: 2019-02-28 | End: 2019-03-30

## 2019-02-28 ASSESSMENT — PATIENT HEALTH QUESTIONNAIRE - PHQ9
2. FEELING DOWN, DEPRESSED, IRRITABLE, OR HOPELESS: 1
6. FEELING BAD ABOUT YOURSELF - OR THAT YOU ARE A FAILURE OR HAVE LET YOURSELF OR YOUR FAMILY DOWN: 0
3. TROUBLE FALLING OR STAYING ASLEEP OR SLEEPING TOO MUCH: 1
9. THOUGHTS THAT YOU WOULD BE BETTER OFF DEAD, OR OF HURTING YOURSELF: 0
SUM OF ALL RESPONSES TO PHQ QUESTIONS 1-9: 4
SUM OF ALL RESPONSES TO PHQ QUESTIONS 1-9: 4
CLINICAL INTERPRETATION OF PHQ2 SCORE: 2
4. FEELING TIRED OR HAVING LITTLE ENERGY: 1
7. TROUBLE CONCENTRATING ON THINGS, SUCH AS READING THE NEWSPAPER OR WATCHING TELEVISION: 1
5. POOR APPETITE OR OVEREATING: 0 - NOT AT ALL
1. LITTLE INTEREST OR PLEASURE IN DOING THINGS: 0
8. MOVING OR SPEAKING SO SLOWLY THAT OTHER PEOPLE COULD HAVE NOTICED. OR THE OPPOSITE, BEING SO FIGETY OR RESTLESS THAT YOU HAVE BEEN MOVING AROUND A LOT MORE THAN USUAL: 0
5. POOR APPETITE OR OVEREATING: 0
SUM OF ALL RESPONSES TO PHQ9 QUESTIONS 1 AND 2: 1

## 2019-03-01 NOTE — ASSESSMENT & PLAN NOTE
No hip symptoms since she had left [facility] in 2013.  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

## 2019-03-01 NOTE — ASSESSMENT & PLAN NOTE
Patient indicates she has a history of irritable bowel symptoms, her colonoscopy is up-to-date she had it in 2018 and was told she would be good for 10 years.  Bowel symptoms are typically well controlled if she stays on a diet that avoids her triggers.

## 2019-03-01 NOTE — ASSESSMENT & PLAN NOTE
She says she has been having a little more anxiety lately because she is going through a divorce,and this has been very stressful because they share a company together.  She takes Xanax as needed, her prescription drug monitoring database indicates that her last fill of this medication was 5/29/18.  Typically she uses Xanax about once per week, up to 3 times per week.  Sometimes her anxiety is so bad that she feels like she is tremulous.  She never drinks alcohol or takes any other sedating medications with the Xanax.

## 2019-03-01 NOTE — ASSESSMENT & PLAN NOTE
Patient Health Questionaire    Little interest or pleasure in doing things?: 0   Feeling down, depressed, or hopeless?: 1  Trouble falling or staying asleep, or sleeping too much? : 1  Feeling tired or having little energy? : 1  Poor appetite or overeating? : 0  Feeling bad about yourself - or that you are a failure or have let yourself or your family down? : 0  Trouble concentrating on things, such as reading the newspaper or watching television? : 1  Moving or speaking so slowly that other people could have noticed.  Or the opposite - being so fidgety or restless that you have been moving around alot more than usual. : 0  Thoughts that you would be better off dead, or of hurting yourself?: 0  Patient Health Questionnaire Score: 4    If depressive symptoms identified deferred to follow up visit unless specifically addressed in assesment and plan.    Interpretation of PHQ-9 Total Score   Score Severity   1-4 No Depression   5-9 Mild Depression   10-14 Moderate Depression   15-19 Moderately Severe Depression   20-27 Severe Depression    She has been on fluoxetine for nearly 20 years since loss of her mother.  She feels that her depression and mood is overall well controlled despite the increased anxiety going through her divorce.

## 2019-03-01 NOTE — ASSESSMENT & PLAN NOTE
She indicates that she is seeing Dr. Fierro's and was told that she was told his neck arthritis, and she has had imaging throughout her spine.  She is undergoing physical therapy.

## 2019-03-01 NOTE — ASSESSMENT & PLAN NOTE
Obtained and reviewed patient utilization report from [facility] pharmacy database on 2/28/2019 4:17 PM  prior to writing prescription for controlled substance II, III or IV per [place] bill . Based on assessment of the report,my physical exam if necessary and the patient's health problem, the prescription is medically necessary.

## 2019-03-01 NOTE — ASSESSMENT & PLAN NOTE
She denies any cardiopulmonary symptoms, states her blood pressure medications were recently increased by her cardiologist Dr. TWYLA Scherer and she has pending follow-up for blood pressure.  She is taking lisinopril and amlodipine.  She denies any symptoms of hypotension at the current blood pressure recording.  Additionally she says she had recent labs done last Tuesday at [place] [facility] and she will send us a record of this.

## 2019-03-01 NOTE — PROGRESS NOTES
CC:  Diagnoses of Controlled substance agreement signed, Anxiety, LILY (generalized anxiety disorder), Adjustment disorder with depressed mood, History of avascular necrosis of capital femoral epiphysis, Bilateral hand numbness, Gastroesophageal reflux disease, esophagitis presence not specified, Abdominal cramping, and Essential hypertension were pertinent to this visit.    HISTORY OF THE PRESENT ILLNESS: Patient is a 64 y.o. female. This pleasant patient is here today to [facility], and med refill.      Controlled substance agreement signed  Obtained and reviewed patient utilization report from [facility] pharmacy database on 2/28/2019 4:17 PM  prior to writing prescription for controlled substance II, III or IV per [place] bill . Based on assessment of the report,my physical exam if necessary and the patient's health problem, the prescription is medically necessary.       LILY (generalized anxiety disorder)  She says she has been having a little more anxiety lately because she is going through a divorce,and this has been very stressful because they share a company together.  She takes Xanax as needed, her prescription drug monitoring database indicates that her last fill of this medication was 5/29/18.  Typically she uses Xanax about once per week, up to 3 times per week.  Sometimes her anxiety is so bad that she feels like she is tremulous.  She never drinks alcohol or takes any other sedating medications with the Xanax.            Adjustment disorder with depressed mood  Patient Health Questionaire    Little interest or pleasure in doing things?: 0   Feeling down, depressed, or hopeless?: 1  Trouble falling or staying asleep, or sleeping too much? : 1  Feeling tired or having little energy? : 1  Poor appetite or overeating? : 0  Feeling bad about yourself - or that you are a failure or have let yourself or your family down? : 0  Trouble concentrating on things, such as reading the newspaper or watching  television? : 1  Moving or speaking so slowly that other people could have noticed.  Or the opposite - being so fidgety or restless that you have been moving around alot more than usual. : 0  Thoughts that you would be better off dead, or of hurting yourself?: 0  Patient Health Questionnaire Score: 4    If depressive symptoms identified deferred to follow up visit unless specifically addressed in assesment and plan.    Interpretation of PHQ-9 Total Score   Score Severity   1-4 No Depression   5-9 Mild Depression   10-14 Moderate Depression   15-19 Moderately Severe Depression   20-27 Severe Depression    She has been on fluoxetine for nearly 20 years since loss of her mother.  She feels that her depression and mood is overall well controlled despite the increased anxiety going through her divorce.    History of avascular necrosis of capital femoral epiphysis  No hip symptoms since she had left [place] in 2013.  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    Bilateral hand numbness  She indicates that she is seeing Dr. Fierro's and was told that she was told his neck arthritis, and she has had imaging throughout her spine.  She is undergoing physical therapy.    GERD (gastroesophageal reflux disease)  GERD is well controlled on omeprazole.  She denies any GI symptoms, dysphagia, etc.    Abdominal cramping  Patient indicates she has a history of irritable bowel symptoms, her colonoscopy is up-to-date she had it in 2018 and was told she would be good for 10 years.  Bowel symptoms are typically well controlled if she stays on a diet that avoids her triggers.    Essential hypertension  She denies any cardiopulmonary symptoms, states her blood pressure medications were recently increased by her cardiologist Dr. TWYLA Scherer and she has pending follow-up for blood pressure.  She is taking lisinopril and amlodipine.  She denies any symptoms of hypotension at the current blood  pressure recording.  Additionally she says she had recent labs done last Tuesday at [facility] and she will send us a record of this.    Allergies: Patient has no known allergies.    Current Outpatient Prescriptions Ordered in [facility]   Medication Sig Dispense Refill   • ALPRAZolam (XANAX) 0.5 MG Tab Take 1 Tab by mouth at bedtime as needed for Anxiety for up to 30 days. 30 Tab 0   • lisinopril (PRINIVIL) 10 MG Tab TAKE 1 TABLET BY MOUTH EVERY DAY 90 Tab 3   • paroxetine (PAXIL) 40 MG tablet TAKE 1 TAB BY MOUTH EVERY MORNING. INDICATIONS: GENERALIZED ANXIETY DISORDER 90 Tab 3   • omeprazole (PRILOSEC) 20 MG delayed-release capsule Take 1 Cap by mouth every day. 90 Cap 3   • Calcium Acetate, Phos Binder, (CALCIUM ACETATE PO) Take  by mouth.     • ETHIN ESTRADIOL-NORETHIND ACE (JINTELI) 1-5 MG-MCG TABS Take 1 Tab by mouth every morning.     • amLODIPine (NORVASC) 10 MG Tab Take 10 mg by mouth every day.  3   • meloxicam (MOBIC) 7.5 MG Tab Take 7.5 mg by mouth every day.  3     No current [facility]-ordered facility-administered medications on file.        Past Medical History:   Diagnosis Date   • Anxiety 10/30/2014   • Bilateral hand numbness 10/30/2014   • GERD (gastroesophageal reflux disease) 10/30/2014   • Hiatus hernia syndrome    • Pain 08-04-15    lower back, 2/10       Past Surgical History:   Procedure Laterality Date   • LUMBAR LAMINECTOMY DISKECTOMY Right 2015    Procedure: LUMBAR LAMINECTOMY DISKECTOMY LEIGH- L4-5;  Surgeon: Shay Silver M.D.;  Location: SURGERY [facility];  Service:    • HIP ARTHROPLASTY TOTAL  2013    Performed by Luis M Sequeira M.D. at SURGERY [facility]   • PB ENLARGE BREAST WITH IMPLANT     • MAMMOPLASTY AUGMENTATION     • PB  DELIVERY ONLY     • PRIMARY C SECTION         Social History   Substance Use Topics   • Smoking status: Never Smoker   • Smokeless tobacco: Never Used   • Alcohol use 2.0 oz/week     4 Standard drinks or equivalent per week  "     Comment: 3 per week or less       Social History     Social History Narrative   • No narrative on file       Family History   Problem Relation Age of Onset   • Stroke Mother         aneurysm age 61 cerebral   • Cancer Father         prostate   • Alcohol abuse Father    • Stroke Unknown    • Arthritis Sister         RA       ROS:     - Constitutional: Negative for fever, chills, unexpected weight change, night sweats    - Eyes:   Negative for blurry vision, eye pain, discharge    - ENT:  Negative for hearing changes, ear pain, ear discharge, rhinorrhea, sinus congestion, sore throat     - Respiratory: Negative for cough, sputum production, chest congestion, dyspnea, wheezing, and crackles.      - Cardiovascular: Negative for chest pain, palpitations, orthopnea, and bilateral lower extremity edema.     - Gastrointestinal: Negative for heartburn, nausea, vomiting, abdominal pain, hematochezia, melena, diarrhea, constipation, and greasy/foul-smelling stools.     - Genitourinary: Negative for dysuria, polyuria, hematuria, pyuria, urinary urgency, and urinary incontinence.     - Musculoskeletal:  See hpi    - Skin: Negative for rash, itching, cyanotic skin color change.     - Neurological: Negative for  vertigo    - Endo:Negative for polyuria, heat/cold intolerance, excessive thirst    - Hem/lymphatic: Negative for easy bruising, blood clots, lymphedema, swollen glands    -Allergic/immun: Negative for allergic rhinitis    - Psychiatric/Behavioral: Negative forsuicidal/homicidal ideation and memory loss.      Exam: Blood pressure 104/68, pulse (!) 105, temperature 36.8 °C (98.2 °F), temperature source Temporal, height 1.549 m (5' 1\"), weight 62 kg (136 lb 11 oz), SpO2 95 %, not currently breastfeeding. Body mass index is 25.83 kg/m².    General: Normal appearing. No distress.  EYES: Conjunctiva clear lids without ptosis, pupils equal  EARS: Normal shape and contour   NOSE, THROAT: nasal mucosa benign. oropharynx is " without erythema, edema or exudates.   Neck: Supple without LAD. Thyroid is not enlarged.  Pulmonary: Clear to ausculation.  Normal effort. No rales or wheezing.  Cardiovascular: Regular rate and rhythm without significant murmur.   Abdomen: Soft, nontender, nondistended. Normal bowel sounds.  Neurologic: Cranial nerves grossly nonfocal  Lymph: No cervical, supraclavicular nodes palpable  Skin: Warm and dry.  No obvious lesions.  Musculoskeletal: Normal gait. No extremity cyanosis, clubbing, or edema.  Psych: Normal mood and affect. Alert and oriented x3. Judgment and insight is normal.        Assessment/Plan  1. Controlled substance agreement signed  Today we verbally discussed her controlled substance agreement that she needs to be careful that the medication is not lost/stolen, do not mix with alcohol or sedating medications, do not share medications, etc. and she expressed understanding.    2. Anxiety  Reasonable for patient to continue rare to occasional use of Xanax.  Obtained and reviewed patient utilization report from [facility] pharmacy database on 2/28/2019 5:44 PM  prior to writing prescription for controlled substance II, III or IV per [place] bill . Based on assessment of the report,my physical exam if necessary and the patient's health problem, the prescription is medically necessary.     - ALPRAZolam (XANAX) 0.5 MG Tab; Take 1 Tab by mouth at bedtime as needed for Anxiety for up to 30 days.  Dispense: 30 Tab; Refill: 0    3. LILY (generalized anxiety disorder)  Controlled on fluoxetine    4. Adjustment disorder with depressed mood  Mood overall controlled on paroxetine with some breakthrough anxiety symptoms going through a divorce that are responding well to Xanax    5. History of avascular necrosis of capital femoral epiphysis  No current hip pain, doing well, this is a resolved issue    6. Bilateral hand numbness  Being followed by her neurosurgeon, patient indicates she is doing well with  physical therapy    7. Gastroesophageal reflux disease, esophagitis presence not specified  Controlled on PPI    8. Abdominal cramping  Irritable bowel symptoms are controlled by maintaining diet free of her triggers and she indicates colonoscopy last year was normal    9. Essential hypertension  Blood pressure is controlled on lisinopril and amlodipine    Health maintenance:  Colonoscopy done 2018 due again 2028  She says she had a Pap smear about 2 years ago and she is regularly followed by her gynecologist Dr. Miri Minaya with last gynecology exam about a month ago and was normal per patient.  Mammogram is up-to-date          Please note that this dictation was created using voice recognition software. I have made every reasonable attempt to correct obvious errors, but I expect that there are errors of grammar and possibly content that I did not discover before finalizing the note.

## 2019-03-04 DIAGNOSIS — F41.9 ANXIETY: ICD-10-CM

## 2019-03-04 RX ORDER — PAROXETINE HYDROCHLORIDE 40 MG/1
40 TABLET, FILM COATED ORAL EVERY MORNING
Qty: 90 TAB | Refills: 3 | Status: SHIPPED | OUTPATIENT
Start: 2019-03-04 | End: 2021-11-30 | Stop reason: SDUPTHER

## 2019-03-26 ENCOUNTER — OFFICE VISIT (OUTPATIENT)
Dept: MEDICAL GROUP | Facility: MEDICAL CENTER | Age: 64
End: 2019-03-26
Payer: COMMERCIAL

## 2019-03-26 ENCOUNTER — HOSPITAL ENCOUNTER (OUTPATIENT)
Dept: RADIOLOGY | Facility: MEDICAL CENTER | Age: 64
End: 2019-03-26
Attending: PHYSICIAN ASSISTANT
Payer: COMMERCIAL

## 2019-03-26 VITALS
BODY MASS INDEX: 25.06 KG/M2 | OXYGEN SATURATION: 95 % | DIASTOLIC BLOOD PRESSURE: 78 MMHG | TEMPERATURE: 98.7 F | HEART RATE: 87 BPM | RESPIRATION RATE: 16 BRPM | SYSTOLIC BLOOD PRESSURE: 126 MMHG | WEIGHT: 132.72 LBS | HEIGHT: 61 IN

## 2019-03-26 DIAGNOSIS — M79.642 LEFT HAND PAIN: ICD-10-CM

## 2019-03-26 PROBLEM — Z00.00 PREVENTATIVE HEALTH CARE: Status: RESOLVED | Noted: 2017-04-26 | Resolved: 2019-03-26

## 2019-03-26 PROCEDURE — 99214 OFFICE O/P EST MOD 30 MIN: CPT | Performed by: PHYSICIAN ASSISTANT

## 2019-03-26 PROCEDURE — 73130 X-RAY EXAM OF HAND: CPT | Mod: LT
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
## 2019-06-19 ENCOUNTER — TELEPHONE (OUTPATIENT)
Dept: MEDICAL GROUP | Facility: MEDICAL CENTER | Age: 64
End: 2019-06-19

## 2019-06-20 ENCOUNTER — OFFICE VISIT (OUTPATIENT)
Dept: MEDICAL GROUP | Facility: MEDICAL CENTER | Age: 64
End: 2019-06-20
Payer: COMMERCIAL

## 2019-06-20 VITALS
RESPIRATION RATE: 14 BRPM | WEIGHT: 127.98 LBS | TEMPERATURE: 98.2 F | DIASTOLIC BLOOD PRESSURE: 70 MMHG | BODY MASS INDEX: 24.16 KG/M2 | HEART RATE: 88 BPM | HEIGHT: 61 IN | OXYGEN SATURATION: 97 % | SYSTOLIC BLOOD PRESSURE: 122 MMHG

## 2019-06-20 DIAGNOSIS — Z00.00 PREVENTATIVE HEALTH CARE: ICD-10-CM

## 2019-06-20 DIAGNOSIS — D75.89 MACROCYTOSIS: ICD-10-CM

## 2019-06-20 DIAGNOSIS — Z11.59 ENCOUNTER FOR HEPATITIS C SCREENING TEST FOR LOW RISK PATIENT: ICD-10-CM

## 2019-06-20 DIAGNOSIS — Z63.4 BEREAVEMENT: ICD-10-CM

## 2019-06-20 PROCEDURE — 99214 OFFICE O/P EST MOD 30 MIN: CPT | Performed by: INTERNAL MEDICINE

## 2019-06-20 RX ORDER — METOPROLOL SUCCINATE 25 MG/1
25 TABLET, EXTENDED RELEASE ORAL EVERY MORNING
Refills: 5 | COMMUNITY
Start: 2019-05-25 | End: 2022-10-10 | Stop reason: SDUPTHER

## 2019-06-20 RX ORDER — TIZANIDINE 4 MG/1
4 TABLET ORAL
Refills: 0 | COMMUNITY
Start: 2019-05-07 | End: 2021-11-26

## 2019-06-20 RX ORDER — ALPRAZOLAM 0.5 MG/1
0.5 TABLET ORAL NIGHTLY PRN
Qty: 30 TAB | Refills: 0 | Status: SHIPPED | OUTPATIENT
Start: 2019-06-20 | End: 2019-06-21

## 2019-06-20 SDOH — SOCIAL STABILITY - SOCIAL INSECURITY: DISSAPEARANCE AND DEATH OF FAMILY MEMBER: Z63.4

## 2019-06-20 NOTE — PROGRESS NOTES
CC:  Diagnoses of Macrocytosis, Bereavement, Preventative health care, and Encounter for hepatitis C screening test for low risk patient were pertinent to this visit.    HISTORY OF THE PRESENT ILLNESS: Patient is a 64 y.o. female. This pleasant patient is here today to follow-up.    Unfortunately she lost her  recently, is actually her ex-, though they were very close and  for at least 33 years.  They were in the middle of closing their [employer]/company at the time of his passing.  She was the one that found him in his home the morning of his death.  This was understandably very traumatic.  Her daughter and sister have been very supportive.  She last had Xanax filled 2/20/2019 0.5 mg dose 30 pills and has just recently gotten close to almost running out, requests refill, this is appropriate.  She also continues on her paroxetine 40 mg.  She is interested in going to psychology for grief counseling.  No SI or HI.  No alcohol intake except rarely and she will not mix with Xanax.  Muscle relaxer for her neck, has not used this for many weeks and she will not mix this with Xanax.  Having trouble sleeping, not eating as much and lost some weight.  Her sister is closely monitoring her intake and making sure she is eating.    Allergies: Patient has no known allergies.    Current Outpatient Prescriptions Ordered in [facility]   Medication Sig Dispense Refill   • ALPRAZolam (XANAX) 0.5 MG Tab Take 1 Tab by mouth at bedtime as needed for Sleep for up to 1 day. 30 Tab 0   • metoprolol SR (TOPROL XL) 25 MG TABLET SR 24 HR Take 25 mg by mouth every day.  5   • tizanidine (ZANAFLEX) 4 MG Tab Take 4 mg by mouth 1 time daily as needed.  0   • paroxetine (PAXIL) 40 MG tablet Take 1 Tab by mouth every morning. 90 Tab 3   • amLODIPine (NORVASC) 10 MG Tab Take 10 mg by mouth every day.  3   • meloxicam (MOBIC) 7.5 MG Tab Take 7.5 mg by mouth every day.  3   • lisinopril (PRINIVIL) 10 MG Tab TAKE 1 TABLET BY MOUTH  EVERY DAY 90 Tab 3   • omeprazole (PRILOSEC) 20 MG delayed-release capsule Take 1 Cap by mouth every day. 90 Cap 3   • Calcium Acetate, Phos Binder, (CALCIUM ACETATE PO) Take  by mouth.     • ETHIN ESTRADIOL-NORETHIND ACE (JINTELI) 1-5 MG-MCG TABS Take 1 Tab by mouth every morning.       No current [facility]-ordered facility-administered medications on file.        Past Medical History:   Diagnosis Date   • Anxiety 10/30/2014   • Bilateral hand numbness 10/30/2014   • GERD (gastroesophageal reflux disease) 10/30/2014   • Hiatus hernia syndrome    • Pain 08-04-15    lower back, 2/10       Past Surgical History:   Procedure Laterality Date   • LUMBAR LAMINECTOMY DISKECTOMY Right 2015    Procedure: LUMBAR LAMINECTOMY DISKECTOMY LEIGH- L4-5;  Surgeon: Shay Silver M.D.;  Location: SURGERY [facility];  Service:    • HIP ARTHROPLASTY TOTAL  2013    Performed by Luis M Sequeira M.D. at SURGERY [facility]   • PB ENLARGE BREAST WITH IMPLANT     • MAMMOPLASTY AUGMENTATION     • PB  DELIVERY ONLY     • PRIMARY C SECTION         Social History   Substance Use Topics   • Smoking status: Never Smoker   • Smokeless tobacco: Never Used   • Alcohol use 2.0 oz/week     4 Standard drinks or equivalent per week      Comment: 3 per week or less       Social History     Social History Narrative   • No narrative on file       Family History   Problem Relation Age of Onset   • Stroke Mother         aneurysm age 61 cerebral   • Cancer Father         prostate   • Alcohol abuse Father    • Stroke Unknown    • Arthritis Sister         RA       ROS:     - Constitutional: Negative for fever, chills, unexpected weight change, night sweats    - Eyes:   Negative for blurry vision, eye pain, discharge    - ENT:  Negative for hearing changes, ear pain, ear discharge, rhinorrhea, sinus congestion, sore throat     - Respiratory: Negative for cough, sputum production, chest congestion, dyspnea, wheezing, and  "crackles.      - Cardiovascular: Negative for chest pain, palpitations, orthopnea, and bilateral lower extremity edema.     - Gastrointestinal: Negative for heartburn, nausea, vomiting, abdominal pain, hematochezia, melena, diarrhea, constipation, and greasy/foul-smelling stools.     - Genitourinary: Negative for dysuria, polyuria, hematuria, pyuria, urinary urgency, and urinary incontinence.     - Musculoskeletal: Negative for myalgias, back pain, and joint pain.     - Skin: Negative for rash, itching, cyanotic skin color change.     - Neurological: Negative for migraines, numbness, ataxia, tremors, vertigo    - Endo:Negative for polyuria, heat/cold intolerance, excessive thirst    - Hem/lymphatic: egative for easy bruising, blood clots, lymphedema, swollen glands    -Allergic/immun: Negative for allergic rhinitis    - Psychiatric/Behavioral: See HPI    Exam: /70 (BP Location: Left arm, Patient Position: Sitting, BP Cuff Size: Adult)   Pulse 88   Temp 36.8 °C (98.2 °F) (Temporal)   Resp 14   Ht 1.549 m (5' 1\")   SpO2 97%  Body mass index is 25.08 kg/m².    General: Normal appearing. No distress.  EYES: Conjunctiva clear lids without ptosis, pupils equal  EARS: Normal shape and contour   Neurologic: Cranial nerves grossly nonfocal  Skin: Warm and dry.  No obvious lesions.  Musculoskeletal: Normal gait. No extremity cyanosis, clubbing, or edema.  Psych: Normal mood and depressed affect. Alert and oriented x3. Judgment and insight is normal.      Assessment/Plan  1. Macrocytosis  Remote CBC showed MCV of 105, rule out B12 deficiency with her chronic omeprazole use.  - VITAMIN B12; Future    2. Bereavement  Patient seems overall stable, she has normal levels of bereavement after the loss of her ex-.  Obtained and reviewed patient utilization report from [facility] pharmacy database on 6/20/2019 1:28 PM  prior to writing prescription for controlled substance II, III or IV per [place] bill . Based " on assessment of the report,my physical exam if necessary and the patient's health problem, the prescription is medically necessary.     - ALPRAZolam (XANAX) 0.5 MG Tab; Take 1 Tab by mouth at bedtime as needed for Sleep for up to 1 day.  Dispense: 30 Tab; Refill: 0  - URINE DRUG SCREEN; Future  - REFERRAL TO PSYCHOLOGY  Contract previously signed.  Strict warning not to mix with anything sedating including muscle relaxers or alcohol and patient agrees.    3. Preventative health care  - CBC WITH DIFFERENTIAL; Future  - Comp Metabolic Panel; Future  - Lipid Profile; Future    4. Encounter for hepatitis C screening test for low risk patient  Recommend to screen based on preventative health measures.  - HEP C VIRUS ANTIBODY; Future      Return to clinic 4 months or sooner if needed        Please note that this dictation was created using voice recognition software. I have made every reasonable attempt to correct obvious errors, but I expect that there are errors of grammar and possibly content that I did not discover before finalizing the note.

## 2019-06-20 NOTE — TELEPHONE ENCOUNTER
1. Caller Name: Lovely Palmer                                           Call Back Number: [phone] (home) [phone] (work)        Patient approves a detailed voicemail message: N\A    Patient called upset that her medication has been denied, for Xanex. She stated she needed it her   last week.

## 2019-07-30 LAB
ALBUMIN SERPL-MCNC: 4.3 G/DL (ref 3.6–4.8)
ALBUMIN/GLOB SERPL: 1.7 {RATIO} (ref 1.2–2.2)
ALP SERPL-CCNC: 58 IU/L (ref 39–117)
ALT SERPL-CCNC: 36 IU/L (ref 0–32)
AST SERPL-CCNC: 43 IU/L (ref 0–40)
BASOPHILS # BLD AUTO: 0 X10E3/UL (ref 0–0.2)
BASOPHILS NFR BLD AUTO: 1 %
BILIRUB SERPL-MCNC: 0.5 MG/DL (ref 0–1.2)
BUN SERPL-MCNC: 11 MG/DL (ref 8–27)
BUN/CREAT SERPL: 16 (ref 12–28)
CALCIUM SERPL-MCNC: 9.1 MG/DL (ref 8.7–10.3)
CHLORIDE SERPL-SCNC: 102 MMOL/L (ref 96–106)
CHOLEST SERPL-MCNC: 243 MG/DL (ref 100–199)
CO2 SERPL-SCNC: 21 MMOL/L (ref 20–29)
CREAT SERPL-MCNC: 0.69 MG/DL (ref 0.57–1)
EOSINOPHIL # BLD AUTO: 0 X10E3/UL (ref 0–0.4)
EOSINOPHIL NFR BLD AUTO: 0 %
ERYTHROCYTE [DISTWIDTH] IN BLOOD BY AUTOMATED COUNT: 15 % (ref 12.3–15.4)
GLOBULIN SER CALC-MCNC: 2.5 G/DL (ref 1.5–4.5)
GLUCOSE SERPL-MCNC: 97 MG/DL (ref 65–99)
HCT VFR BLD AUTO: 42.7 % (ref 34–46.6)
HCV AB S/CO SERPL IA: <0.1 S/CO RATIO (ref 0–0.9)
HDLC SERPL-MCNC: 83 MG/DL
HGB BLD-MCNC: 14.3 G/DL (ref 11.1–15.9)
IMM GRANULOCYTES # BLD AUTO: 0 X10E3/UL (ref 0–0.1)
IMM GRANULOCYTES NFR BLD AUTO: 0 %
IMMATURE CELLS  115398: NORMAL
LABORATORY COMMENT REPORT: ABNORMAL
LDLC SERPL CALC-MCNC: 146 MG/DL (ref 0–99)
LYMPHOCYTES # BLD AUTO: 1.4 X10E3/UL (ref 0.7–3.1)
LYMPHOCYTES NFR BLD AUTO: 34 %
MCH RBC QN AUTO: 32.2 PG (ref 26.6–33)
MCHC RBC AUTO-ENTMCNC: 33.5 G/DL (ref 31.5–35.7)
MCV RBC AUTO: 96 FL (ref 79–97)
MONOCYTES # BLD AUTO: 0.3 X10E3/UL (ref 0.1–0.9)
MONOCYTES NFR BLD AUTO: 7 %
MORPHOLOGY BLD-IMP: NORMAL
NEUTROPHILS # BLD AUTO: 2.4 X10E3/UL (ref 1.4–7)
NEUTROPHILS NFR BLD AUTO: 58 %
NRBC BLD AUTO-RTO: NORMAL %
PLATELET # BLD AUTO: 280 X10E3/UL (ref 150–450)
POTASSIUM SERPL-SCNC: 4.4 MMOL/L (ref 3.5–5.2)
PROT SERPL-MCNC: 6.8 G/DL (ref 6–8.5)
RBC # BLD AUTO: 4.44 X10E6/UL (ref 3.77–5.28)
SODIUM SERPL-SCNC: 140 MMOL/L (ref 134–144)
TRIGL SERPL-MCNC: 68 MG/DL (ref 0–149)
VIT B12 SERPL-MCNC: 394 PG/ML (ref 232–1245)
VLDLC SERPL CALC-MCNC: 14 MG/DL (ref 5–40)
WBC # BLD AUTO: 4.1 X10E3/UL (ref 3.4–10.8)

## 2019-08-07 ENCOUNTER — APPOINTMENT (OUTPATIENT)
Dept: RADIOLOGY | Facility: IMAGING CENTER | Age: 64
End: 2019-08-07
Attending: PHYSICIAN ASSISTANT
Payer: OTHER MISCELLANEOUS

## 2019-08-07 ENCOUNTER — OFFICE VISIT (OUTPATIENT)
Dept: URGENT CARE | Facility: CLINIC | Age: 64
End: 2019-08-07
Payer: OTHER MISCELLANEOUS

## 2019-08-07 VITALS
HEIGHT: 61 IN | HEART RATE: 78 BPM | BODY MASS INDEX: 23.98 KG/M2 | OXYGEN SATURATION: 95 % | DIASTOLIC BLOOD PRESSURE: 85 MMHG | WEIGHT: 127 LBS | SYSTOLIC BLOOD PRESSURE: 132 MMHG | TEMPERATURE: 98.2 F | RESPIRATION RATE: 16 BRPM

## 2019-08-07 DIAGNOSIS — S92.354A NONDISPLACED FRACTURE OF FIFTH METATARSAL BONE, RIGHT FOOT, INITIAL ENCOUNTER FOR CLOSED FRACTURE: ICD-10-CM

## 2019-08-07 DIAGNOSIS — S99.921A RIGHT FOOT INJURY, INITIAL ENCOUNTER: ICD-10-CM

## 2019-08-07 PROCEDURE — 73630 X-RAY EXAM OF FOOT: CPT | Mod: TC,RT | Performed by: PHYSICIAN ASSISTANT

## 2019-08-07 PROCEDURE — 99213 OFFICE O/P EST LOW 20 MIN: CPT | Performed by: PHYSICIAN ASSISTANT

## 2019-08-07 ASSESSMENT — ENCOUNTER SYMPTOMS
CHILLS: 0
WEAKNESS: 0
SORE THROAT: 0
NAUSEA: 0
MYALGIAS: 1
PALPITATIONS: 0
SHORTNESS OF BREATH: 0
VOMITING: 0
TINGLING: 0
NUMBNESS: 0
SENSORY CHANGE: 0
BLURRED VISION: 0
FEVER: 0

## 2019-08-07 NOTE — PROGRESS NOTES
Subjective:      Lovely Palmer is a 64 y.o. female who presents with Ankle Injury (Fell down couple hrs ago hurt right ankle .)      Foot Problem   This is a new problem. The current episode started today. The problem occurs constantly. The problem has been gradually worsening. Associated symptoms include myalgias (Right foot pain). Pertinent negatives include no chest pain, chills, fever, nausea, numbness, sore throat, vomiting or weakness. The symptoms are aggravated by walking and standing. She has tried nothing for the symptoms.   Patient states injury occurred earlier today while she was walking down some stairs.  She said that she misjudged the last step and missed a completely.  She said she landed on the tile floor and her right foot twisted underneath her.      Review of Systems   Constitutional: Negative for chills and fever.   HENT: Negative for sore throat.    Eyes: Negative for blurred vision.   Respiratory: Negative for shortness of breath.    Cardiovascular: Negative for chest pain and palpitations.   Gastrointestinal: Negative for nausea and vomiting.   Musculoskeletal: Positive for myalgias (Right foot pain). Negative for joint pain.   Neurological: Negative for tingling, sensory change, weakness and numbness.       PMH:  has a past medical history of Anxiety (10/30/2014), Bilateral hand numbness (10/30/2014), GERD (gastroesophageal reflux disease) (10/30/2014), Hiatus hernia syndrome, and Pain (08-04-15).  MEDS:   Current Outpatient Medications:   •  metoprolol SR (TOPROL XL) 25 MG TABLET SR 24 HR, Take 25 mg by mouth every day., Disp: , Rfl: 5  •  tizanidine (ZANAFLEX) 4 MG Tab, Take 4 mg by mouth 1 time daily as needed., Disp: , Rfl: 0  •  paroxetine (PAXIL) 40 MG tablet, Take 1 Tab by mouth every morning., Disp: 90 Tab, Rfl: 3  •  amLODIPine (NORVASC) 10 MG Tab, Take 10 mg by mouth every day., Disp: , Rfl: 3  •  meloxicam (MOBIC) 7.5 MG Tab, Take 7.5 mg by mouth every day., Disp: , Rfl: 3  •   "lisinopril (PRINIVIL) 10 MG Tab, TAKE 1 TABLET BY MOUTH EVERY DAY, Disp: 90 Tab, Rfl: 3  •  omeprazole (PRILOSEC) 20 MG delayed-release capsule, Take 1 Cap by mouth every day., Disp: 90 Cap, Rfl: 3  •  Calcium Acetate, Phos Binder, (CALCIUM ACETATE PO), Take  by mouth., Disp: , Rfl:   •  ETHIN ESTRADIOL-NORETHIND ACE (JINTELI) 1-5 MG-MCG TABS, Take 1 Tab by mouth every morning., Disp: , Rfl:   ALLERGIES: No Known Allergies  SURGHX:   Past Surgical History:   Procedure Laterality Date   • LUMBAR LAMINECTOMY DISKECTOMY Right 2015    Procedure: LUMBAR LAMINECTOMY DISKECTOMY LEIGH- L4-5;  Surgeon: Shay Silver M.D.;  Location: SURGERY [facility];  Service:    • HIP ARTHROPLASTY TOTAL  2013    Performed by Luis M Sequeira M.D. at SURGERY [facility]   • PB ENLARGE BREAST WITH IMPLANT     • MAMMOPLASTY AUGMENTATION     • PB  DELIVERY ONLY     • PRIMARY C SECTION       SOCHX:  reports that she has never smoked. She has never used smokeless tobacco. She reports that she drinks about 2.0 oz of alcohol per week. She reports that she does not use drugs.  FH: Family history was reviewed, no pertinent findings to report     Objective:     /85   Pulse 78   Temp 36.8 °C (98.2 °F) (Temporal)   Resp 16   Ht 1.549 m (5' 1\")   Wt 57.6 kg (127 lb)   SpO2 95%   BMI 24.00 kg/m²      Physical Exam   Constitutional: She is oriented to person, place, and time. She appears well-developed and well-nourished.   HENT:   Head: Normocephalic and atraumatic.   Right Ear: External ear normal.   Left Ear: External ear normal.   Eyes: Pupils are equal, round, and reactive to light. Conjunctivae are normal.   Cardiovascular: Normal rate, regular rhythm and normal heart sounds.   No murmur heard.  Pulmonary/Chest: Effort normal and breath sounds normal. She has no wheezes.   Musculoskeletal:        Right foot: There is tenderness, bony tenderness and swelling. There is normal range of motion, " normal capillary refill, no crepitus, no deformity and no laceration.        Feet:    Neurological: She is alert and oriented to person, place, and time.   Skin: Skin is warm and dry. Capillary refill takes less than 2 seconds.   Psychiatric: She has a normal mood and affect. Her behavior is normal. Judgment normal.       DX-FOOT-COMPLETE 3+ RIGHT  Impression       Nondisplaced extra-articular fifth metatarsal fracture.           Assessment/Plan:     1. Nondisplaced fracture of fifth metatarsal bone, right foot, initial encounter for closed fracture  - DX-FOOT-COMPLETE 3+ RIGHT; Future  - REFERRAL TO ORTHOPEDICS  - OTC analgesics as needed for pain  - Elevate  - Patient was placed in a posterior Ortho-Glass splint and advised to be nonweightbearing      Differential Diagnosis, natural history, and supportive care discussed. Return to the Urgent Care or follow up with your PCP if symptoms fail to resolve, or for any new or worsening symptoms. Emergency room precautions discussed. Patient and/or family appears understanding of information.

## 2019-08-28 ENCOUNTER — APPOINTMENT (OUTPATIENT)
Dept: MEDICAL GROUP | Facility: MEDICAL CENTER | Age: 64
End: 2019-08-28
Payer: OTHER MISCELLANEOUS

## 2019-10-08 ENCOUNTER — OFFICE VISIT (OUTPATIENT)
Dept: MEDICAL GROUP | Facility: MEDICAL CENTER | Age: 64
End: 2019-10-08
Payer: OTHER MISCELLANEOUS

## 2019-10-08 VITALS
HEART RATE: 87 BPM | BODY MASS INDEX: 25.49 KG/M2 | DIASTOLIC BLOOD PRESSURE: 70 MMHG | OXYGEN SATURATION: 97 % | RESPIRATION RATE: 12 BRPM | WEIGHT: 135 LBS | HEIGHT: 61 IN | TEMPERATURE: 98.2 F | SYSTOLIC BLOOD PRESSURE: 130 MMHG

## 2019-10-08 DIAGNOSIS — F41.1 GAD (GENERALIZED ANXIETY DISORDER): ICD-10-CM

## 2019-10-08 DIAGNOSIS — R20.0 BILATERAL HAND NUMBNESS: ICD-10-CM

## 2019-10-08 DIAGNOSIS — R74.8 ELEVATED LIVER ENZYMES: ICD-10-CM

## 2019-10-08 DIAGNOSIS — Z96.642 HISTORY OF LEFT HIP REPLACEMENT: ICD-10-CM

## 2019-10-08 DIAGNOSIS — I10 ESSENTIAL HYPERTENSION: ICD-10-CM

## 2019-10-08 DIAGNOSIS — D75.89 MACROCYTOSIS: ICD-10-CM

## 2019-10-08 DIAGNOSIS — E78.5 DYSLIPIDEMIA: ICD-10-CM

## 2019-10-08 PROCEDURE — 99214 OFFICE O/P EST MOD 30 MIN: CPT | Performed by: INTERNAL MEDICINE

## 2019-10-08 NOTE — PROGRESS NOTES
"CC:  Diagnoses of Elevated liver enzymes, Dyslipidemia, History of left hip replacement, Bilateral hand numbness, Essential hypertension, and LILY (generalized anxiety disorder) were pertinent to this visit.    HISTORY OF THE PRESENT ILLNESS: Patient is a 64 y.o. female. This pleasant patient is here today to follow-up.      \" I feel much better.\"  She indicates her anxiety is controlled.  Went back to the gym on Monday.  Did gain about 7 pounds.  She had a foot fracture that she was recovering from and recently got cleared to go back to the gym on Monday.    Labs from 7/29/2019 AST 43, ALT 36.  She denies any excessive alcohol use, drinks a glass of wine maybe 2 times a week.  No right upper quadrant pain, nausea, vomiting.  Rare use of Tylenol.  She has weaned off meloxicam.    The macrocytosis resolved.  B12 is normal 394.    Lipids from 7/29/2019 total cholesterol 243, triglycerides 60, HDL 83 and .  We used the [facility] calculator we found that off statin 6 out of 100 patients over 10 years to have a heart attack, on high-dose statin this drops to 4.  Patient wishes to see what her cholesterol does over the next few months with diet and exercise.    Has chronic neck pain, she says physical therapy made it worse, she saw a neurologist who recommended physical therapy.  She knows she has arthritis in her neck.  Sometimes arthritis pains in her wrists that is improved with her wrist splint. saw neurosx. offered emg UE, pt held off. gave muscle relaxer.  Cervical MRI 1/18/2019 did show multifocal degenerative disease of the cervical spine.  She is happy with continued monitoring.  She will consider swimming to see if this helps her symptoms.    She sees her cardiologist in a week.  She will monitor her home blood pressure to see if is around 120/80.  Denies any symptoms of hypotension.  No cardiopulmonary symptoms.    Pap smear will be done in January, no history of abnormal Pap smears.    Allergies: " Patient has no known allergies.    Current Outpatient Medications Ordered in Epic   Medication Sig Dispense Refill   • metoprolol SR (TOPROL XL) 25 MG TABLET SR 24 HR Take 25 mg by mouth every day.  5   • tizanidine (ZANAFLEX) 4 MG Tab Take 4 mg by mouth 1 time daily as needed.  0   • paroxetine (PAXIL) 40 MG tablet Take 1 Tab by mouth every morning. 90 Tab 3   • amLODIPine (NORVASC) 10 MG Tab Take 10 mg by mouth every day.  3   • meloxicam (MOBIC) 7.5 MG Tab Take 7.5 mg by mouth every day.  3   • lisinopril (PRINIVIL) 10 MG Tab TAKE 1 TABLET BY MOUTH EVERY DAY 90 Tab 3   • omeprazole (PRILOSEC) 20 MG delayed-release capsule Take 1 Cap by mouth every day. 90 Cap 3   • Calcium Acetate, Phos Binder, (CALCIUM ACETATE PO) Take  by mouth.     • ETHIN ESTRADIOL-NORETHIND ACE (JINTELI) 1-5 MG-MCG TABS Take 1 Tab by mouth every morning.       No current [facility]-ordered facility-administered medications on file.        Past Medical History:   Diagnosis Date   • Anxiety 10/30/2014   • Bilateral hand numbness 10/30/2014   • GERD (gastroesophageal reflux disease) 10/30/2014   • Hiatus hernia syndrome    • Pain 08-04-15    lower back, 2/10       Past Surgical History:   Procedure Laterality Date   • LUMBAR LAMINECTOMY DISKECTOMY Right 2015    Procedure: LUMBAR LAMINECTOMY DISKECTOMY LEIGH- L4-5;  Surgeon: Shay Silver M.D.;  Location: SURGERY [facility];  Service:    • HIP ARTHROPLASTY TOTAL  2013    Performed by Luis M Sequeira M.D. at SURGERY [facility]   • PB ENLARGE BREAST WITH IMPLANT     • MAMMOPLASTY AUGMENTATION     • PB  DELIVERY ONLY     • PRIMARY C SECTION         Social History     Tobacco Use   • Smoking status: Never Smoker   • Smokeless tobacco: Never Used   Substance Use Topics   • Alcohol use: Yes     Alcohol/week: 2.0 oz     Types: 4 Standard drinks or equivalent per week     Comment: 3 per week or less   • Drug use: No       Social History     Social History Narrative  "  • Not on file       Family History   Problem Relation Age of Onset   • Stroke Mother         aneurysm age 61 cerebral   • Cancer Father         prostate   • Alcohol abuse Father    • Stroke Unknown    • Arthritis Sister         RA       ROS:     - Constitutional: Negative for fever, chills, unexpected weight change, night sweats    - Eyes:   Negative for blurry vision, eye pain, discharge    - ENT:  Negative for hearing changes, ear pain, ear discharge, rhinorrhea, sinus congestion, sore throat     - Respiratory: Negative for cough, sputum production, chest congestion, dyspnea, wheezing, and crackles.      - Cardiovascular: Negative for chest pain, palpitations, orthopnea, and bilateral lower extremity edema.     - Gastrointestinal: Negative for heartburn, nausea, vomiting, abdominal pain, hematochezia, melena, diarrhea, constipation, and greasy/foul-smelling stools.     - Genitourinary: Negative for dysuria, polyuria, hematuria, pyuria, urinary urgency, and urinary incontinence.     - Musculoskeletal: See HPI    - Skin: Negative for rash, itching, cyanotic skin color change.     - Neurological: Negative for migraines, numbness, ataxia, tremors, vertigo    - Endo:Negative for polyuria, heat/cold intolerance, excessive thirst    - Hem/lymphatic: Negative for easy bruising, blood clots, lymphedema, swollen glands    -Allergic/immun: Negative for allergic rhinitis    - Psychiatric/Behavioral: Negative for depression, suicidal/homicidal ideation and memory loss.      Exam: /70 (BP Location: Right arm, Patient Position: Sitting, BP Cuff Size: Adult)   Pulse 87   Temp 36.8 °C (98.2 °F) (Temporal)   Resp 12   Ht 1.549 m (5' 1\")   Wt 61.2 kg (135 lb)   SpO2 97%  Body mass index is 25.51 kg/m².    General: Normal appearing. No distress.  EYES: Conjunctiva clear lids without ptosis, pupils equal  EARS: Normal shape and contour   Pulmonary: Clear to ausculation.  Normal effort. No rales or " wheezing.  Cardiovascular: Regular rate and rhythm without significant murmur.   Abdomen: Soft, nontender, nondistended. Normal bowel sounds.  Neurologic: Cranial nerves grossly nonfocal  Skin: Warm and dry.  No obvious lesions.  Musculoskeletal: Normal gait. No extremity cyanosis, clubbing, or edema.  Psych: Normal mood and affect. Alert and oriented x3. Judgment and insight is normal.    Assessment/Plan  1. Elevated liver enzymes  New issue.  May have been due to the weight gain.  Patient will work on weight loss, diet changes and we will reassess.  A symptomatically this time.  - HEPATIC FUNCTION PANEL; Future    2. Dyslipidemia  Patient prefers therapeutic lifestyle changes with reassessment on interval labs.  We could decrease her risk of MI over 10 years w/ high-dose statin.  - Lipid Profile; Future    3. History of left hip replacement  She has prophylactic antibiotics as needed.    4. Bilateral hand numbness  See HPI, she did see specialty teams, status post physical therapy, etc. at this time she prefers conservative management.  Stable.    5. Essential hypertension  Suspect controlled at home, continue current management.  Stable.  Asymptomatic.    6. LILY (generalized anxiety disorder)  Chronic and stable, continue current management.        Return to clinic 4 months      Please note that this dictation was created using voice recognition software. I have made every reasonable attempt to correct obvious errors, but I expect that there are errors of grammar and possibly content that I did not discover before finalizing the note.

## 2020-02-12 ENCOUNTER — APPOINTMENT (OUTPATIENT)
Dept: MEDICAL GROUP | Facility: MEDICAL CENTER | Age: 65
End: 2020-02-12
Payer: OTHER MISCELLANEOUS

## 2021-03-03 DIAGNOSIS — Z23 NEED FOR VACCINATION: ICD-10-CM

## 2021-04-06 ENCOUNTER — APPOINTMENT (RX ONLY)
Dept: URBAN - METROPOLITAN AREA CLINIC 20 | Facility: CLINIC | Age: 66
Setting detail: DERMATOLOGY
End: 2021-04-06

## 2021-04-06 DIAGNOSIS — L82.0 INFLAMED SEBORRHEIC KERATOSIS: ICD-10-CM

## 2021-04-06 DIAGNOSIS — L71.8 OTHER ROSACEA: ICD-10-CM

## 2021-04-06 PROCEDURE — ? LIQUID NITROGEN

## 2021-04-06 PROCEDURE — 17110 DESTRUCTION B9 LES UP TO 14: CPT

## 2021-04-06 PROCEDURE — ? ADDITIONAL NOTES

## 2021-04-06 PROCEDURE — ? COUNSELING

## 2021-04-06 PROCEDURE — 99203 OFFICE O/P NEW LOW 30 MIN: CPT | Mod: 25

## 2021-04-06 ASSESSMENT — LOCATION SIMPLE DESCRIPTION DERM
LOCATION SIMPLE: LEFT CHEEK
LOCATION SIMPLE: LEFT TEMPLE
LOCATION SIMPLE: RIGHT CHEEK

## 2021-04-06 ASSESSMENT — LOCATION DETAILED DESCRIPTION DERM
LOCATION DETAILED: LEFT SUPERIOR TEMPLE
LOCATION DETAILED: LEFT INFERIOR CENTRAL MALAR CHEEK
LOCATION DETAILED: RIGHT INFERIOR CENTRAL MALAR CHEEK

## 2021-04-06 ASSESSMENT — LOCATION ZONE DERM: LOCATION ZONE: FACE

## 2021-04-06 NOTE — PROCEDURE: ADDITIONAL NOTES
Render Risk Assessment In Note?: no
Additional Notes: Patient is being treated at [facility] with stem cells\\nDiscussed laser treatments- pt interested\\nWill schedule cosmetic consultation
Detail Level: Simple
Additional Notes: Advised on physical based sunscreen qday at least spf 30\\nAvoid triggers\\nDiscussed vbeam tx as patient's primary concern is redness - scheduled pt for laser consult with Precious.

## 2021-04-21 ENCOUNTER — APPOINTMENT (RX ONLY)
Dept: URBAN - METROPOLITAN AREA CLINIC 20 | Facility: CLINIC | Age: 66
Setting detail: DERMATOLOGY
End: 2021-04-21

## 2021-04-21 DIAGNOSIS — Z41.9 ENCOUNTER FOR PROCEDURE FOR PURPOSES OTHER THAN REMEDYING HEALTH STATE, UNSPECIFIED: ICD-10-CM

## 2021-04-21 PROCEDURE — ? ADDITIONAL NOTES

## 2021-04-21 NOTE — PROCEDURE: ADDITIONAL NOTES
Additional Notes: Patient is here today for treatment of rosacea. She went to Dr. Jensen. She had profrac 4x and said it cleared her rosacea decently. Precious thinks she would be a better candidate for BBL instead of Vbeam. The bbl will improve her overall complexion to clear her browns as well as the vascualar. Her flare ups are mostly on her cheeks and nose but Precious explained to her that she suggests getting the full face done to help her overall skin tone. She will be quoted for 3 bbl treatments. She has had some treatments done, like profrac. No history of cold sores.
Detail Level: Simple
Render Risk Assessment In Note?: no

## 2021-04-28 ENCOUNTER — APPOINTMENT (RX ONLY)
Dept: URBAN - METROPOLITAN AREA CLINIC 20 | Facility: CLINIC | Age: 66
Setting detail: DERMATOLOGY
End: 2021-04-28

## 2021-04-28 DIAGNOSIS — Z41.9 ENCOUNTER FOR PROCEDURE FOR PURPOSES OTHER THAN REMEDYING HEALTH STATE, UNSPECIFIED: ICD-10-CM

## 2021-04-28 PROCEDURE — ? SCITON BBL

## 2021-04-28 ASSESSMENT — LOCATION SIMPLE DESCRIPTION DERM
LOCATION SIMPLE: RIGHT CHEEK
LOCATION SIMPLE: LEFT CHEEK
LOCATION SIMPLE: RIGHT LIP
LOCATION SIMPLE: LEFT FOREHEAD

## 2021-04-28 ASSESSMENT — LOCATION DETAILED DESCRIPTION DERM
LOCATION DETAILED: RIGHT LOWER CUTANEOUS LIP
LOCATION DETAILED: LEFT INFERIOR CENTRAL MALAR CHEEK
LOCATION DETAILED: RIGHT INFERIOR CENTRAL MALAR CHEEK
LOCATION DETAILED: LEFT MEDIAL FOREHEAD

## 2021-04-28 ASSESSMENT — LOCATION ZONE DERM
LOCATION ZONE: LIP
LOCATION ZONE: FACE

## 2021-04-28 NOTE — PROCEDURE: SCITON BBL
Pulse Duration Units: milliseconds
Consent: Written consent obtained, risks reviewed including but not limited to crusting, scabbing, blistering, scarring, darker or lighter pigmentary change, bruising, and/or incomplete response.
Cooling (In C): 15
Pulse Duration: 10
Passes: 1
Fluence (J/Cm2): 12
Cooling (In C): 20
Price (Use Numbers Only, No Special Characters Or $): 251
Spot Size: Finesse Adapter Size: 15 x 45 mm (No Finesse Adapter)
Additional Comments (Optional): same settings using 15x15
Cooling ?: Yes
Fluence (J/Cm2): 21
Location Override (Will Not Show Above If Text Entered): full face spot treat
Indication Override (Will Not Show Above If Text Entered): vascular
Post-Care Instructions: I reviewed with the patient in detail post-care instructions. Patient should stay away from the sun and wear sun protection until treated areas are fully healed.
Fluence (J/Cm2): 8
Hide Repetition Rate?: No
Anesthesia Volume In Cc: 0
Spot Size: Finesse Adapter Size: 7 mm round
Preprocedure Text: The treatment areas were thoroughly cleaned. Any exposed at risk hair that was not to be treated was covered in white paper tape. Clear ultrasound gel was applied to the treatment area. The area was treated with no immediate stacking of pulses.
Post Procedure Text: The patient tolerated the procedure well. Ice-chilled washclothes were applied to the treatment area for comfort. Post care was reviewed with the patient.
Cooling (In C): 25
Fluence (J/Cm2): 14
Detail Level: Zone
Indication Override (Will Not Show Above If Text Entered): vessels

## 2021-05-26 ENCOUNTER — APPOINTMENT (RX ONLY)
Dept: URBAN - METROPOLITAN AREA CLINIC 20 | Facility: CLINIC | Age: 66
Setting detail: DERMATOLOGY
End: 2021-05-26

## 2021-05-26 DIAGNOSIS — Z41.9 ENCOUNTER FOR PROCEDURE FOR PURPOSES OTHER THAN REMEDYING HEALTH STATE, UNSPECIFIED: ICD-10-CM

## 2021-05-26 PROCEDURE — ? SCITON BBL

## 2021-05-26 PROCEDURE — ? PULSED-DYE LASER

## 2021-05-26 ASSESSMENT — LOCATION SIMPLE DESCRIPTION DERM
LOCATION SIMPLE: INFERIOR FOREHEAD
LOCATION SIMPLE: NOSE
LOCATION SIMPLE: LEFT CHEEK
LOCATION SIMPLE: RIGHT CHEEK

## 2021-05-26 ASSESSMENT — LOCATION DETAILED DESCRIPTION DERM
LOCATION DETAILED: INFERIOR MID FOREHEAD
LOCATION DETAILED: NASAL DORSUM
LOCATION DETAILED: LEFT INFERIOR CENTRAL MALAR CHEEK
LOCATION DETAILED: RIGHT INFERIOR CENTRAL MALAR CHEEK

## 2021-05-26 ASSESSMENT — LOCATION ZONE DERM
LOCATION ZONE: NOSE
LOCATION ZONE: FACE

## 2021-05-26 NOTE — PROCEDURE: SCITON BBL
Cooling ?: Yes
Pulse Duration: 20
Spot Size: Finesse Adapter Size: 15 x 45 mm (No Finesse Adapter)
Post-Care Instructions: I reviewed with the patient in detail post-care instructions. Patient should stay away from the sun and wear sun protection until treated areas are fully healed.
Fluence (J/Cm2): 14
Cooling (In C): 15
Passes: 1
Pulse Duration Units: milliseconds
Detail Level: Zone
Hide Repetition Rate?: No
Repetition Rate (Hz): 10
Location Override (Will Not Show Above If Text Entered): Full Face Spot Treat
Spot Size: Finesse Adapter Size: 7 mm round
Additional Comments (Optional): same setting using 15x15
Price (Use Numbers Only, No Special Characters Or $): 450
Cooling (In C): 22
Anesthesia Volume In Cc: 0
Indication Override (Will Not Show Above If Text Entered): Vascular
Fluence (J/Cm2): 8
Fluence (J/Cm2): 25
Treatment Number: 2
Fluence (J/Cm2): 9
Post Procedure Text: The patient tolerated the procedure well. Ice-chilled washclothes were applied to the treatment area for comfort. Post care was reviewed with the patient.
Preprocedure Text: The treatment areas were thoroughly cleaned. Any exposed at risk hair that was not to be treated was covered in white paper tape. Clear ultrasound gel was applied to the treatment area. The area was treated with no immediate stacking of pulses.
Spot Size: Finesse Adapter Size: 15 x 15 mm square
Consent: Written consent obtained, risks reviewed including but not limited to crusting, scabbing, blistering, scarring, darker or lighter pigmentary change, bruising, and/or incomplete response.

## 2021-05-26 NOTE — PROCEDURE: PULSED-DYE LASER
Delay Time (Ms): 20
Spot Size: 7 mm
Cryogen Time (Ms): 30
Fluence In J/Cm2 (Optional): 15.0
Pulse Duration: 10 ms
Consent: Written consent obtained, risks reviewed including but not limited to crusting, scabbing, blistering, scarring, darker or lighter pigmentary change, incidental hair removal, bruising, and/or incomplete removal.
Laser Type: Vbeam 595nm
Post-Care Instructions: I reviewed with the patient in detail post-care instructions. Patient should stay away from the sun and wear sun protection until treated areas are fully healed.
Detail Level: Simple
Location (Required For Details To Render In Note But Body Touch Will Also Count For First Location): nose
Spot Size: 10x3 mm
Post-Procedure Care: Vaseline and ice applied. Post care reviewed with patient.
Fluence In J/Cm2 (Optional): 9.75
Pulse Duration: 30 ms

## 2021-07-16 ENCOUNTER — APPOINTMENT (RX ONLY)
Dept: URBAN - METROPOLITAN AREA CLINIC 20 | Facility: CLINIC | Age: 66
Setting detail: DERMATOLOGY
End: 2021-07-16

## 2021-07-16 DIAGNOSIS — Z41.9 ENCOUNTER FOR PROCEDURE FOR PURPOSES OTHER THAN REMEDYING HEALTH STATE, UNSPECIFIED: ICD-10-CM

## 2021-07-16 PROCEDURE — ? PULSED-DYE LASER

## 2021-07-16 ASSESSMENT — LOCATION SIMPLE DESCRIPTION DERM
LOCATION SIMPLE: LEFT CHEEK
LOCATION SIMPLE: RIGHT CHEEK

## 2021-07-16 ASSESSMENT — LOCATION DETAILED DESCRIPTION DERM
LOCATION DETAILED: LEFT CENTRAL MALAR CHEEK
LOCATION DETAILED: RIGHT INFERIOR CENTRAL MALAR CHEEK

## 2021-07-16 ASSESSMENT — LOCATION ZONE DERM: LOCATION ZONE: FACE

## 2021-07-16 NOTE — PROCEDURE: PULSED-DYE LASER
Consent: Written consent obtained, risks reviewed including but not limited to crusting, scabbing, blistering, scarring, darker or lighter pigmentary change, incidental hair removal, bruising, and/or incomplete removal.
Spot Size: 7 mm
Cryogen Time (Ms): 30
Post-Procedure Care: Vaseline and ice applied. Post care reviewed with patient.
Spot Size: 10x3 mm
Pulse Duration: 30 ms
Pulse Duration: 6 ms
Post-Care Instructions: I reviewed with the patient in detail post-care instructions. Patient should stay away from the sun and wear sun protection until treated areas are fully healed.
Spot Size: 10 mm
Delay Time (Ms): 20
Laser Type: Vbeam 595nm
Price (Use Numbers Only, No Special Characters Or $): 300
Location (Required For Details To Render In Note But Body Touch Will Also Count For First Location): mid face
Detail Level: Simple
Pulse Duration: 10 ms
Fluence In J/Cm2 (Optional): 15.0
Fluence In J/Cm2 (Optional): 6.25

## 2021-10-04 NOTE — TELEPHONE ENCOUNTER
Last seen: 07/19/21 by Dr. Arora  Next appt:None    Was the patient seen in the last year in this department? Yes   Does patient have an active prescription for medications requested? No   Received Request Via: Patient    MED REQUESTED : ATORVASTATIN 20MG 1 PO QD

## 2021-10-05 RX ORDER — ATORVASTATIN CALCIUM 20 MG/1
20 TABLET, FILM COATED ORAL NIGHTLY
COMMUNITY
End: 2021-10-06 | Stop reason: SDUPTHER

## 2021-10-06 DIAGNOSIS — I10 ESSENTIAL HYPERTENSION: ICD-10-CM

## 2021-10-06 NOTE — TELEPHONE ENCOUNTER
Last seen: 07/19/21 by Dr. Arora  Next appt: None    Was the patient seen in the last year in this department? Yes   Does patient have an active prescription for medications requested? No   Received Request Via: Patient call

## 2021-10-07 RX ORDER — ATORVASTATIN CALCIUM 20 MG/1
20 TABLET, FILM COATED ORAL DAILY
Qty: 30 TABLET | Refills: 2 | Status: SHIPPED | OUTPATIENT
Start: 2021-10-07 | End: 2021-10-11 | Stop reason: CLARIF

## 2021-10-07 RX ORDER — LISINOPRIL 10 MG/1
10 TABLET ORAL
Qty: 90 TABLET | Refills: 3 | Status: SHIPPED | OUTPATIENT
Start: 2021-10-07 | End: 2021-10-14

## 2021-10-11 RX ORDER — ATORVASTATIN CALCIUM 20 MG/1
20 TABLET, FILM COATED ORAL DAILY
Qty: 90 TABLET | Refills: 0 | Status: SHIPPED | OUTPATIENT
Start: 2021-10-11 | End: 2022-01-09

## 2021-10-13 ENCOUNTER — TELEPHONE (OUTPATIENT)
Dept: INTERNAL MEDICINE | Facility: OTHER | Age: 66
End: 2021-10-13

## 2021-10-13 NOTE — TELEPHONE ENCOUNTER
Patient called left voice message stating Lisinopril should be 20 mg per last office visit 07/10/2021. Patient  Lisinopril 10 mg please advise.

## 2021-10-14 RX ORDER — LISINOPRIL 20 MG/1
20 TABLET ORAL DAILY
Qty: 90 TABLET | Refills: 0 | Status: SHIPPED | OUTPATIENT
Start: 2021-10-14 | End: 2022-01-11

## 2021-10-18 NOTE — TELEPHONE ENCOUNTER
Lisinopril refill    Received request via: Pharmacy    Was the patient seen in the last year in this department? Yes 7/1921 [facility]    Does the patient have an active prescription (recently filled or refills available) for medication(s) requested? No

## 2021-10-20 NOTE — PROGRESS NOTES
Subjective:     Chief Complaint   Patient presents with   • Medication Refill   • Anxiety     Lovely Palmer is a 62 y.o. female here today for anxiety as listed below    Anxiety  - 09/19/2017- xanax 0.5mg #30.   Last seen 5/2017.   We increased paxil to 40mg qd and started xanax 0.5mg prn (pt takes once every few days. Helping.   She is  with . And now  has found a 24yo GF. Has been distancing himself from kids and family. 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   Has been seeing therapist once monthly- wants to go more often but [facility]  is so busy she can only get in once monthly and then she is on waiting list.    Does feel a little better with having xanax for flaring days- stomach still turns but not as much.     Caffeine: 1 cups daily, no sidas, no energy drinks.   Diet: healthier diet    Exercise: 3 times weekly   etoh- 1 weekly- does not take with xanax   Drug use: none   PHQ9 score stable at 5, denies SI/SH.     LILY-7  Over the last 2 weeks, on how many days have you been bothered by the following problems?  1. Feeling nervous, anxious or on edge  2  2. Not being able stop or control worry  2  3. Worrying too much about different things  0  4. Trouble relaxing  0  5. Being so restless it is hard to sit still  0  6. Becoming easily annoyed or irritable  0  7. Feeling afraid as if something awful might happen 0  Total=  4    Essential hypertension  This is chronic. Stable.   Monitoring BP at home. her home BP readings are on average 120's-130's/high 80's.   Today 122/90  Currently taking lisinopril 10mg qd as directed. Also taking baby aspirin.   Denies lightheadedness, vision changes, headache, palpitations, chest pain, cough or leg swelling.       Current medicines (including changes today)  Current Outpatient Prescriptions   Medication Sig Dispense Refill   • alprazolam (XANAX) 0.5 MG Tab Take 1 Tab by mouth at bedtime as  "needed for Anxiety. 30 Tab 2   • paroxetine (PAXIL) 40 MG tablet TAKE 1 TAB BY MOUTH EVERY MORNING. INDICATIONS: GENERALIZED ANXIETY DISORDER 90 Tab 0   • cyclobenzaprine (FLEXERIL) 10 MG Tab Take 1 Tab by mouth every bedtime. 20 Tab 0   • Diclofenac Sodium (VOLTAREN) 1 % Gel Apply 1 Application to skin as directed 4 times a day. 1 Tube 0   • omeprazole (PRILOSEC) 20 MG delayed-release capsule Take 1 Cap by mouth every day. 90 Cap 3   • diclofenac EC (VOLTAREN) 75 MG Tablet Delayed Response Take 75 mg by mouth 2 times a day, with meals.  5   • lisinopril (PRINIVIL) 10 MG Tab Take 1 Tab by mouth every day. TAKE 1 TAB BY MOUTH EVERY DAY. 90 Tab 3   • Calcium Acetate, Phos Binder, (CALCIUM ACETATE PO) Take  by mouth.     • ETHIN ESTRADIOL-NORETHIND ACE (JINTELI) 1-5 MG-MCG TABS Take 1 Tab by mouth every morning.       No current facility-administered medications for this visit.      She  has a past medical history of Anxiety (10/30/2014); Bilateral hand numbness (10/30/2014); GERD (gastroesophageal reflux disease) (10/30/2014); Hiatus hernia syndrome; and Pain (08-04-15).    ROS   No chest pain, no shortness of breath, no abdominal pain       Objective:     Blood pressure 122/90, pulse 97, temperature 37.2 °C (98.9 °F), resp. rate 12, height 1.549 m (5' 1\"), weight 55.8 kg (123 lb), SpO2 95 %, not currently breastfeeding. Body mass index is 23.24 kg/m².   Physical Exam:  Alert, oriented in no acute distress.  Eye contact is good, speech goal directed, affect calm  HEENT: conjunctiva non-injected, sclera non-icteric, PERRL.  Neck No adenopathy or masses in the neck or supraclavicular regions.  Lungs: clear to auscultation bilaterally with good excursion.  CV: regular rate and rhythm.  Abdomen: soft, nontender, no HSM, No CVAT  Ext: no edema, color normal, peripheral pulses 2+, temperature normal    Assessment and Plan:   The following treatment plan was discussed     1. Anxiety  Stable, would like her to see therapist " more often. We will call to see if she can go 2 time monthly or even once weekly.   Continue with paxil 40mg qd, xanax 0.5mg nightly prn.   Do NOT drink or drive with this medication  Obtained and reviewed the patient uses a short report from state pharmacy database on 12/1/2017 and based on assessment of report, the prescription for xanax is medically necessary.  - alprazolam (XANAX) 0.5 MG Tab; Take 1 Tab by mouth at bedtime as needed for Anxiety.  Dispense: 30 Tab; Refill: 2    2. Essential hypertension  Notice on exam elevated, per pt has been slightly higher lately.   Would like to recheck in 1 mo and possibly increase lisinopril.   Continue with diet and exercise. This will also help anxiety.       Followup: Return in about 4 weeks (around 12/29/2017) for htn.           Please note that this dictation was created using voice recognition software. I have made every reasonable attempt to correct obvious errors, but I expect that there are errors of grammar and possibly content that I did not discover before finalizing the note.   3 or 4

## 2021-11-26 ENCOUNTER — APPOINTMENT (OUTPATIENT)
Dept: RADIOLOGY | Facility: MEDICAL CENTER | Age: 66
End: 2021-11-26
Attending: EMERGENCY MEDICINE
Payer: MEDICARE

## 2021-11-26 ENCOUNTER — HOSPITAL ENCOUNTER (EMERGENCY)
Facility: MEDICAL CENTER | Age: 66
End: 2021-11-26
Attending: EMERGENCY MEDICINE
Payer: MEDICARE

## 2021-11-26 VITALS
SYSTOLIC BLOOD PRESSURE: 125 MMHG | WEIGHT: 132.5 LBS | HEART RATE: 92 BPM | HEIGHT: 61 IN | DIASTOLIC BLOOD PRESSURE: 85 MMHG | TEMPERATURE: 97.4 F | RESPIRATION RATE: 16 BRPM | BODY MASS INDEX: 25.02 KG/M2 | OXYGEN SATURATION: 94 %

## 2021-11-26 DIAGNOSIS — M54.50 ACUTE BILATERAL LOW BACK PAIN WITHOUT SCIATICA: ICD-10-CM

## 2021-11-26 DIAGNOSIS — K29.00 ACUTE GASTRITIS WITHOUT HEMORRHAGE, UNSPECIFIED GASTRITIS TYPE: ICD-10-CM

## 2021-11-26 DIAGNOSIS — R10.84 GENERALIZED ABDOMINAL PAIN: ICD-10-CM

## 2021-11-26 DIAGNOSIS — K59.00 CONSTIPATION, UNSPECIFIED CONSTIPATION TYPE: ICD-10-CM

## 2021-11-26 LAB
ALBUMIN SERPL BCP-MCNC: 4.1 G/DL (ref 3.2–4.9)
ALBUMIN/GLOB SERPL: 1.4 G/DL
ALP SERPL-CCNC: 83 U/L (ref 30–99)
ALT SERPL-CCNC: 28 U/L (ref 2–50)
ANION GAP SERPL CALC-SCNC: 13 MMOL/L (ref 7–16)
APPEARANCE UR: CLEAR
AST SERPL-CCNC: 31 U/L (ref 12–45)
BASOPHILS # BLD AUTO: 0.8 % (ref 0–1.8)
BASOPHILS # BLD: 0.06 K/UL (ref 0–0.12)
BILIRUB SERPL-MCNC: 0.2 MG/DL (ref 0.1–1.5)
BILIRUB UR QL STRIP.AUTO: NEGATIVE
BUN SERPL-MCNC: 7 MG/DL (ref 8–22)
CALCIUM SERPL-MCNC: 8.6 MG/DL (ref 8.4–10.2)
CHLORIDE SERPL-SCNC: 101 MMOL/L (ref 96–112)
CO2 SERPL-SCNC: 25 MMOL/L (ref 20–33)
COLOR UR: YELLOW
CREAT SERPL-MCNC: 0.64 MG/DL (ref 0.5–1.4)
EOSINOPHIL # BLD AUTO: 0.07 K/UL (ref 0–0.51)
EOSINOPHIL NFR BLD: 0.9 % (ref 0–6.9)
ERYTHROCYTE [DISTWIDTH] IN BLOOD BY AUTOMATED COUNT: 45.1 FL (ref 35.9–50)
GLOBULIN SER CALC-MCNC: 3 G/DL (ref 1.9–3.5)
GLUCOSE SERPL-MCNC: 76 MG/DL (ref 65–99)
GLUCOSE UR STRIP.AUTO-MCNC: NEGATIVE MG/DL
HCT VFR BLD AUTO: 44.9 % (ref 37–47)
HGB BLD-MCNC: 15.1 G/DL (ref 12–16)
IMM GRANULOCYTES # BLD AUTO: 0.02 K/UL (ref 0–0.11)
IMM GRANULOCYTES NFR BLD AUTO: 0.3 % (ref 0–0.9)
KETONES UR STRIP.AUTO-MCNC: NEGATIVE MG/DL
LEUKOCYTE ESTERASE UR QL STRIP.AUTO: NEGATIVE
LIPASE SERPL-CCNC: 41 U/L (ref 7–58)
LYMPHOCYTES # BLD AUTO: 2.02 K/UL (ref 1–4.8)
LYMPHOCYTES NFR BLD: 25.7 % (ref 22–41)
MCH RBC QN AUTO: 33.3 PG (ref 27–33)
MCHC RBC AUTO-ENTMCNC: 33.6 G/DL (ref 33.6–35)
MCV RBC AUTO: 98.9 FL (ref 81.4–97.8)
MICRO URNS: NORMAL
MONOCYTES # BLD AUTO: 0.71 K/UL (ref 0–0.85)
MONOCYTES NFR BLD AUTO: 9 % (ref 0–13.4)
NEUTROPHILS # BLD AUTO: 4.99 K/UL (ref 2–7.15)
NEUTROPHILS NFR BLD: 63.3 % (ref 44–72)
NITRITE UR QL STRIP.AUTO: NEGATIVE
NRBC # BLD AUTO: 0 K/UL
NRBC BLD-RTO: 0 /100 WBC
PH UR STRIP.AUTO: 7 [PH] (ref 5–8)
PLATELET # BLD AUTO: 302 K/UL (ref 164–446)
PMV BLD AUTO: 8.6 FL (ref 9–12.9)
POTASSIUM SERPL-SCNC: 4 MMOL/L (ref 3.6–5.5)
PROT SERPL-MCNC: 7.1 G/DL (ref 6–8.2)
PROT UR QL STRIP: NEGATIVE MG/DL
RBC # BLD AUTO: 4.54 M/UL (ref 4.2–5.4)
RBC UR QL AUTO: NEGATIVE
SODIUM SERPL-SCNC: 139 MMOL/L (ref 135–145)
SP GR UR STRIP.AUTO: 1.01
WBC # BLD AUTO: 7.9 K/UL (ref 4.8–10.8)

## 2021-11-26 PROCEDURE — 700117 HCHG RX CONTRAST REV CODE 255: Performed by: EMERGENCY MEDICINE

## 2021-11-26 PROCEDURE — 80053 COMPREHEN METABOLIC PANEL: CPT

## 2021-11-26 PROCEDURE — 99284 EMERGENCY DEPT VISIT MOD MDM: CPT

## 2021-11-26 PROCEDURE — 81003 URINALYSIS AUTO W/O SCOPE: CPT

## 2021-11-26 PROCEDURE — 85025 COMPLETE CBC W/AUTO DIFF WBC: CPT

## 2021-11-26 PROCEDURE — 74177 CT ABD & PELVIS W/CONTRAST: CPT

## 2021-11-26 PROCEDURE — 83690 ASSAY OF LIPASE: CPT

## 2021-11-26 RX ORDER — NORETHINDRONE ACETATE AND ETHINYL ESTRADIOL 1; 5 MG/1; UG/1
1 TABLET ORAL EVERY MORNING
COMMUNITY

## 2021-11-26 RX ORDER — ACETAMINOPHEN 500 MG
1000 TABLET ORAL
COMMUNITY

## 2021-11-26 RX ORDER — OMEPRAZOLE 40 MG/1
40 CAPSULE, DELAYED RELEASE ORAL DAILY
Qty: 30 CAPSULE | Refills: 0 | Status: SHIPPED | OUTPATIENT
Start: 2021-11-26

## 2021-11-26 RX ORDER — AMLODIPINE BESYLATE 5 MG/1
5 TABLET ORAL EVERY MORNING
Status: SHIPPED | COMMUNITY
End: 2022-01-03 | Stop reason: SDUPTHER

## 2021-11-26 RX ORDER — CALCIUM CITRATE/VITAMIN D3 200MG-6.25
2 TABLET ORAL EVERY MORNING
COMMUNITY

## 2021-11-26 RX ADMIN — IOHEXOL 100 ML: 350 INJECTION, SOLUTION INTRAVENOUS at 12:55

## 2021-11-26 NOTE — ED TRIAGE NOTES
"Pt presents with sister from home for low back pain, abd pain, and nausea. Took Doculax d/t constipation on 11/19. Pt \"cleared out\" that day. Day after that it was blood. Was seen at [facility] urgent care on 11/22. Pending GI consult for colonoscopy but cannot get in til 12/16. Pt persistent symptoms brought her here for evaluation. A&Ox4 and ambulatory.    Has this patient been vaccinated for COVID yes    "

## 2021-11-26 NOTE — ED NOTES
Med rec completed per Pt and Pt's sister at bedside, and Pt's pharmacy [facility] on [address] ([phone]).  Allergies reviewed with Pt. No known drug allergies.  Pt states that she is OUT of her omeprazole.  No oral antibiotics in last 30 days.

## 2021-11-26 NOTE — DISCHARGE INSTRUCTIONS
Follow-up with your regular doctor concerning your ankle if it continues to bother you.  Try ice and rest and 400 mg ibuprofen for pain.  Take the Prilosec for your stomach.  It is important a follow-up with your GI doctor concerning your stomach and pancreas.  Drink plenty of fluids and a high-fiber diet.  Take the mag citrate for your constipation.  I hope you feel better soon.  Fevers, vomiting, worsening pain or concerns return.

## 2021-11-26 NOTE — ED PROVIDER NOTES
ED Provider Note  CHIEF COMPLAINT  Chief Complaint   Patient presents with   • Abdominal Pain   • Low Back Pain       HPI  Lovely Palmer is a 66 y.o. female who presents with 1 week of abdominal pain.  Pain is also in the bilaterally and radiates around into the lower abdomen.  She describes it as crampy pain.  Initially she thought she was constipated and took a Dulcolax and had good results.  Afterwards however she has been having some blood in her stool.  She denies any dysuria or hematuria.  She has not had any fevers.  She does feel like she has chills.  Patient is not on blood thinners.  No dizziness.  No numbness or tingling or weakness in her legs.  No bowel or bladder incontinence.  Nothing seems to make it better or worse.  She has had some nausea and vomiting.  Patient was seen at urgent care for these symptoms were blood work was done.  She does not know these results.  She was also scheduled to follow-up with GI and has appointment on December 16.    REVIEW OF SYSTEMS  See HPI for further details. All other systems are negative.     PAST MEDICAL HISTORY  Past Medical History:   Diagnosis Date   • Pain 08-04-15    lower back, 2/10   • Anxiety 10/30/2014   • Bilateral hand numbness 10/30/2014   • GERD (gastroesophageal reflux disease) 10/30/2014   • Hiatus hernia syndrome        FAMILY HISTORY  [unfilled]    SOCIAL HISTORY  Social History     Socioeconomic History   • Marital status:      Spouse name: Not on file   • Number of children: Not on file   • Years of education: Not on file   • Highest education level: Not on file   Occupational History   • Not on file   Tobacco Use   • Smoking status: Never Smoker   • Smokeless tobacco: Never Used   Vaping Use   • Vaping Use: Never used   Substance and Sexual Activity   • Alcohol use: Yes     Alcohol/week: 2.0 oz     Types: 4 Standard drinks or equivalent per week     Comment: 3 per week or less   • Drug use: No   • Sexual activity: Not Currently      Partners: Male     Birth control/protection: Post-Menopausal   Other Topics Concern   • Not on file   Social History Narrative   • Not on file     Social Determinants of Health     Financial Resource Strain:    • Difficulty of Paying Living Expenses: Not on file   Food Insecurity:    • Worried About Running Out of Food in the Last Year: Not on file   • Ran Out of Food in the Last Year: Not on file   Transportation Needs:    • Lack of Transportation (Medical): Not on file   • Lack of Transportation (Non-Medical): Not on file   Physical Activity:    • Days of Exercise per Week: Not on file   • Minutes of Exercise per Session: Not on file   Stress:    • Feeling of Stress : Not on file   Social Connections:    • Frequency of Communication with Friends and Family: Not on file   • Frequency of Social Gatherings with Friends and Family: Not on file   • Attends [employer] Services: Not on file   • Active Member of Clubs or Organizations: Not on file   • Attends Club or Organization Meetings: Not on file   • Marital Status: Not on file   Intimate Partner Violence:    • Fear of Current or Ex-Partner: Not on file   • Emotionally Abused: Not on file   • Physically Abused: Not on file   • Sexually Abused: Not on file   Housing Stability:    • Unable to Pay for Housing in the Last Year: Not on file   • Number of Places Lived in the Last Year: Not on file   • Unstable Housing in the Last Year: Not on file       SURGICAL HISTORY  Past Surgical History:   Procedure Laterality Date   • LUMBAR LAMINECTOMY DISKECTOMY Right 2015    Procedure: LUMBAR LAMINECTOMY DISKECTOMY LEIGH- L4-5;  Surgeon: Shay Silver M.D.;  Location: SURGERY [facility];  Service:    • HIP ARTHROPLASTY TOTAL  2013    Performed by Luis M Sequeira M.D. at SURGERY [facility]   • AR BREAST AUGMENTATION WITH IMPLANT     • MAMMOPLASTY AUGMENTATION     • PB  DELIVERY ONLY     • PRIMARY C SECTION         CURRENT MEDICATIONS  "  Home Medications     Reviewed by Sarah Anderson R.N. (Registered Nurse) on 11/26/21 at 1123  Med List Status: Not Addressed   Medication Last Dose Status   amLODIPine (NORVASC) 10 MG Tab  Active   atorvastatin (LIPITOR) 20 MG Tab  Active   Calcium Acetate, Phos Binder, (CALCIUM ACETATE PO)  Active   ETHIN ESTRADIOL-NORETHIND ACE (JINTELI) 1-5 MG-MCG TABS  Active   lisinopril (PRINIVIL) 20 MG Tab  Active   meloxicam (MOBIC) 7.5 MG Tab  Active   metoprolol SR (TOPROL XL) 25 MG TABLET SR 24 HR  Active   omeprazole (PRILOSEC) 20 MG delayed-release capsule  Active   paroxetine (PAXIL) 40 MG tablet  Active   tizanidine (ZANAFLEX) 4 MG Tab  Active                ALLERGIES  No Known Allergies    PHYSICAL EXAM  VITAL SIGNS: /96   Pulse 95   Temp 36.3 °C (97.4 °F) (Temporal)   Resp 16   Ht 1.549 m (5' 1\")   Wt 60.1 kg (132 lb 7.9 oz)   SpO2 97%   BMI 25.03 kg/m²       Constitutional: Well developed, No acute distress, Non-toxic appearance.   HENT: Normocephalic, Atraumatic, Bilateral external ears normal, Nose normal.   Eyes: PERRL, EOMI, Conjunctiva normal.   Neck: Normal range of motion, No tenderness, Supple   Cardiovascular: Normal heart rate, Normal rhythm  Thorax & Lungs: Normal breath sounds, No respiratory distress,  No wheezing, No chest tenderness.   Abdomen: Diffuse mid and lower abdominal tenderness, no guarding no rebound, no pulsatile mass, no distention  Skin: Warm, Dry, No erythema, No rash.   Back: Diffuse lower lumbar tenderness, no midline tenderness,No CVA tenderness.   Extremities: Intact distal pulses, No edema, No tenderness   Neurologic: Alert & oriented x 3, Normal motor function, Normal sensory function, No focal deficits noted.   Psychiatric: appropriate    Labs  Results for orders placed or performed during the hospital encounter of 11/26/21   CBC WITH DIFFERENTIAL   Result Value Ref Range    WBC 7.9 4.8 - 10.8 K/uL    RBC 4.54 4.20 - 5.40 M/uL    Hemoglobin 15.1 12.0 - 16.0 g/dL "    Hematocrit 44.9 37.0 - 47.0 %    MCV 98.9 (H) 81.4 - 97.8 fL    MCH 33.3 (H) 27.0 - 33.0 pg    MCHC 33.6 33.6 - 35.0 g/dL    RDW 45.1 35.9 - 50.0 fL    Platelet Count 302 164 - 446 K/uL    MPV 8.6 (L) 9.0 - 12.9 fL    Neutrophils-Polys 63.30 44.00 - 72.00 %    Lymphocytes 25.70 22.00 - 41.00 %    Monocytes 9.00 0.00 - 13.40 %    Eosinophils 0.90 0.00 - 6.90 %    Basophils 0.80 0.00 - 1.80 %    Immature Granulocytes 0.30 0.00 - 0.90 %    Nucleated RBC 0.00 /100 WBC    Neutrophils (Absolute) 4.99 2.00 - 7.15 K/uL    Lymphs (Absolute) 2.02 1.00 - 4.80 K/uL    Monos (Absolute) 0.71 0.00 - 0.85 K/uL    Eos (Absolute) 0.07 0.00 - 0.51 K/uL    Baso (Absolute) 0.06 0.00 - 0.12 K/uL    Immature Granulocytes (abs) 0.02 0.00 - 0.11 K/uL    NRBC (Absolute) 0.00 K/uL   COMP METABOLIC PANEL   Result Value Ref Range    Sodium 139 135 - 145 mmol/L    Potassium 4.0 3.6 - 5.5 mmol/L    Chloride 101 96 - 112 mmol/L    Co2 25 20 - 33 mmol/L    Anion Gap 13.0 7.0 - 16.0    Glucose 76 65 - 99 mg/dL    Bun 7 (L) 8 - 22 mg/dL    Creatinine 0.64 0.50 - 1.40 mg/dL    Calcium 8.6 8.4 - 10.2 mg/dL    AST(SGOT) 31 12 - 45 U/L    ALT(SGPT) 28 2 - 50 U/L    Alkaline Phosphatase 83 30 - 99 U/L    Total Bilirubin 0.2 0.1 - 1.5 mg/dL    Albumin 4.1 3.2 - 4.9 g/dL    Total Protein 7.1 6.0 - 8.2 g/dL    Globulin 3.0 1.9 - 3.5 g/dL    A-G Ratio 1.4 g/dL   LIPASE   Result Value Ref Range    Lipase 41 7 - 58 U/L   URINALYSIS    Specimen: Urine, Clean Catch   Result Value Ref Range    Color Yellow     Character Clear     Specific Gravity 1.015 <1.035    Ph 7.0 5.0 - 8.0    Glucose Negative Negative mg/dL    Ketones Negative Negative mg/dL    Protein Negative Negative mg/dL    Bilirubin Negative Negative    Nitrite Negative Negative    Leukocyte Esterase Negative Negative    Occult Blood Negative Negative    Micro Urine Req see below    ESTIMATED GFR   Result Value Ref Range    GFR If African American >60 >60 mL/min/1.73 m 2    GFR If Non African  American >60 >60 mL/min/1.73 m 2       RADIOLOGY/PROCEDURES  CT-ABDOMEN-PELVIS WITH   Final Result      1.  Above-average colonic stool volume without bowel obstruction identified      2.  Question gastric wall thickening which could be due to incomplete distention or actual wall thickening as could be seen with gastritis favored strongly Menetrier disease, gastric carcinoma, or lymphoma. Recommend clinical correlation and if indicated    this could be evaluated endoscopically      3.  Subcentimeter pancreas head indeterminate cystic mass likely was present on 2012 CT and therefore most likely indicates a benign cyst as can be seen with a pseudocyst. Recommend correlation with pancreatic enzymes      4.  Small sliding hiatal hernia favored over prominent phrenic ampulla          COURSE & MEDICAL DECISION MAKING  Pertinent Labs & Imaging studies reviewed. (See chart for details)  Patient presents emergency department with abdominal pain and low back pain.  Patient is not having numbness or weakness in her legs.  No bowel or bladder incontinence.  No signs to suggest acute cord compression.  She has bilateral lower lumbar pain.  She also has pain in the abdomen.  She had episode of constipation but now is having occasional bloody stools.  Denies fevers.  Has had some nausea.  Positive bowel sounds I do not suspect obstruction.  Will obtain a CT to further evaluate her symptoms.    Laboratory studies have returned and show normal white count.  No evidence of anemia.  No significant electrolyte abnormalities.  Normal liver function tests and normal lipase.  CT scan shows continued above average colonic stool volume.  No evidence of obstruction.  There is evidence of gastric wall thickening.  I have advised the patient to speak with her GI doctor about the CT when she follows up on December 16.  Also advised her to follow-up concerning the cyst on her pancreas.  Urine shows no evidence of infection.  The short-term plan  is for the patient to go home and will try some mag citrate to try to decrease the stool burden.  I have advised the patient to return for worsening abdominal pain, bleeding or worsening back pain.  Precautions were given.  Patient also understands her follow-up plan with GI.  Will place her on Prilosec for her stomach for possible gastritis.    At discharge patient also mentioned she was having tenderness to her right Achilles area.  It slightly tender to palpation.  She has normal range of motion of her foot.  No evidence of cellulitis.  I think she has a mild tendinitis.  I do not suspect gout as its not in the joint.  She is advised to rest ice and take anti-inflammatories.  I told her to take only 400 mg of ibuprofen so it does not exacerbate her gastritis.  I also recommended Tylenol.    FINAL IMPRESSION     1. Generalized abdominal pain    2. Acute bilateral low back pain without sciatica    3. Constipation, unspecified constipation type    4. Acute gastritis without hemorrhage, unspecified gastritis type             Electronically signed by: Katairna Hutchinson M.D., 11/26/2021 11:55 AM

## 2021-11-29 DIAGNOSIS — F41.9 ANXIETY: ICD-10-CM

## 2021-11-29 NOTE — TELEPHONE ENCOUNTER
Patient is requesting a refill on Paroxetine to be sent to the [facility] pharmacy on [facility].

## 2021-12-02 RX ORDER — PAROXETINE HYDROCHLORIDE 40 MG/1
40 TABLET, FILM COATED ORAL EVERY MORNING
Qty: 90 TABLET | Refills: 0 | Status: SHIPPED | OUTPATIENT
Start: 2021-12-02 | End: 2022-03-04 | Stop reason: SDUPTHER

## 2021-12-06 ENCOUNTER — OFFICE VISIT (OUTPATIENT)
Dept: INTERNAL MEDICINE | Facility: OTHER | Age: 66
End: 2021-12-06
Payer: MEDICARE

## 2021-12-06 VITALS
WEIGHT: 136.2 LBS | HEIGHT: 61 IN | DIASTOLIC BLOOD PRESSURE: 87 MMHG | SYSTOLIC BLOOD PRESSURE: 135 MMHG | BODY MASS INDEX: 25.71 KG/M2 | TEMPERATURE: 98.7 F | HEART RATE: 84 BPM | OXYGEN SATURATION: 95 %

## 2021-12-06 DIAGNOSIS — R05.9 COUGH: ICD-10-CM

## 2021-12-06 DIAGNOSIS — R10.30 LOWER ABDOMINAL PAIN: ICD-10-CM

## 2021-12-06 PROBLEM — Z79.899 CONTROLLED SUBSTANCE AGREEMENT SIGNED: Status: RESOLVED | Noted: 2019-02-28 | Resolved: 2021-12-06

## 2021-12-06 PROCEDURE — 99213 OFFICE O/P EST LOW 20 MIN: CPT | Mod: GE | Performed by: STUDENT IN AN ORGANIZED HEALTH CARE EDUCATION/TRAINING PROGRAM

## 2021-12-06 ASSESSMENT — ENCOUNTER SYMPTOMS
VOMITING: 0
SHORTNESS OF BREATH: 0
HEADACHES: 0
MEMORY LOSS: 0
ABDOMINAL PAIN: 1
CHILLS: 0
NAUSEA: 0
INSOMNIA: 0
PALPITATIONS: 0
FEVER: 0
DIZZINESS: 0
COUGH: 1

## 2021-12-06 ASSESSMENT — PATIENT HEALTH QUESTIONNAIRE - PHQ9: CLINICAL INTERPRETATION OF PHQ2 SCORE: 0

## 2021-12-06 ASSESSMENT — FIBROSIS 4 INDEX: FIB4 SCORE: 1.28

## 2021-12-06 NOTE — PROGRESS NOTES
"Subjective:     CC: routine follow up.    HPI:   Lovely is a 66-year-old lady with past medical history of GERD, hypertension, anxiety, dyslipidemia who presents today for routine follow-up.  -Abdominal pain: Patient was in the ER 11/26 complains of lower abdominal pain associated with bloody stool.  CT in the ER revealed gastric wall thickening stool burden.  She was given magnesium citrate and discharged to follow-up with GI on December 16.  Hemoglobin during that ER visit was normal.  Patient states that stool softener given to the ER and having a bowel movement helped relieve the pain.  However she started having pain come back over the last couple of days.  Patient reports that with this part of abdominal pain she feels it is a burning sensation extending to the back.  No evidence of renal stones noted on the CT scan.    -Patient reports cough with expectoration, worse in the morning, not associate with fevers.  Patient is vaccinated for Covid.  Due to get booster dose.    -Review of systems is otherwise normal      Health Maintenance: completed    ROS:  Review of Systems   Constitutional: Negative for chills and fever.   Respiratory: Positive for cough. Negative for shortness of breath.    Cardiovascular: Negative for chest pain, palpitations and leg swelling.   Gastrointestinal: Positive for abdominal pain. Negative for nausea and vomiting.   Neurological: Negative for dizziness and headaches.   Psychiatric/Behavioral: Negative for memory loss. The patient does not have insomnia.        Objective:     Exam:  /87 (BP Location: Left arm, Patient Position: Sitting, BP Cuff Size: Adult)   Pulse 84   Temp 37.1 °C (98.7 °F) (Temporal)   Ht 1.549 m (5' 1\")   Wt 61.8 kg (136 lb 3.2 oz)   SpO2 95%   BMI 25.73 kg/m²  Body mass index is 25.73 kg/m².    Physical Exam  Constitutional:       Appearance: Normal appearance.   Eyes:      Extraocular Movements: Extraocular movements intact.      Pupils: Pupils are " equal, round, and reactive to light.   Cardiovascular:      Rate and Rhythm: Normal rate and regular rhythm.   Pulmonary:      Effort: No respiratory distress.      Breath sounds: Normal breath sounds.   Abdominal:      General: There is no distension.      Palpations: Abdomen is soft.      Tenderness: There is no guarding or rebound.   Musculoskeletal:      Right lower leg: No edema.      Left lower leg: No edema.   Neurological:      General: No focal deficit present.      Mental Status: She is alert and oriented to person, place, and time.   Psychiatric:         Behavior: Behavior normal.         Judgment: Judgment normal.             Assessment & Plan:     66 y.o. female with the following -     Problem List Items Addressed This Visit     None      Visit Diagnoses     Lower abdominal pain      Lower abdominal.  CT findings of gastric wall thickening and excessive stool burden in the ER.  Patient also had episode of bloody stools prior to ER presentation.  -Recommend follow-up with GI as scheduled on December 16 for consideration of EGD and colonoscopy.  -Continue using PPI  -Recommend using over-the-counter stool softeners and high-fiber diet    Cough  : associated with expectoration and no fevers  -Recommend Mucinex to help bring up sputum.          I spent a total of 30 minutes with record review, exam, communication with the patient, communication with other providers, and documentation of this encounter.      Return in about 6 months (around 6/6/2022).    Please note that this dictation was created using voice recognition software. I have made every reasonable attempt to correct obvious errors, but I expect that there are errors of grammar and possibly content that I did not discover before finalizing the note.

## 2022-01-03 RX ORDER — AMLODIPINE BESYLATE 5 MG/1
5 TABLET ORAL EVERY MORNING
Qty: 90 TABLET | Refills: 0 | Status: SHIPPED | OUTPATIENT
Start: 2022-01-03 | End: 2022-04-04 | Stop reason: SDUPTHER

## 2022-01-03 NOTE — TELEPHONE ENCOUNTER
Last seen: 12/06/21 by Dr. Arora  Next appt: 06/01/22 with Dr. Arora    Was the patient seen in the last year in this department? Yes   Does patient have an active prescription for medications requested? No   Received Request Via: Pharmacy

## 2022-01-11 RX ORDER — LISINOPRIL 20 MG/1
20 TABLET ORAL DAILY
Qty: 90 TABLET | Refills: 0 | Status: SHIPPED | OUTPATIENT
Start: 2022-01-11 | End: 2022-06-01 | Stop reason: SDUPTHER

## 2022-03-04 DIAGNOSIS — F41.9 ANXIETY: ICD-10-CM

## 2022-03-04 NOTE — TELEPHONE ENCOUNTER
Last seen: 12/06/21 by Dr. Arora  Next appt: 06/0122 with Dr. Arora    Was the patient seen in the last year in this department? Yes   Does patient have an active prescription for medications requested? No   Received Request Via: Patient

## 2022-03-06 RX ORDER — PAROXETINE HYDROCHLORIDE 40 MG/1
40 TABLET, FILM COATED ORAL EVERY MORNING
Qty: 90 TABLET | Refills: 0 | Status: SHIPPED | OUTPATIENT
Start: 2022-03-06 | End: 2022-06-01 | Stop reason: SDUPTHER

## 2022-04-04 RX ORDER — ATORVASTATIN CALCIUM 20 MG/1
20 TABLET, FILM COATED ORAL NIGHTLY
COMMUNITY
End: 2022-04-12 | Stop reason: SDUPTHER

## 2022-04-05 RX ORDER — AMLODIPINE BESYLATE 5 MG/1
5 TABLET ORAL EVERY MORNING
Qty: 90 TABLET | Refills: 0 | Status: SHIPPED | OUTPATIENT
Start: 2022-04-05 | End: 2022-07-11 | Stop reason: SDUPTHER

## 2022-04-12 ENCOUNTER — TELEPHONE (OUTPATIENT)
Dept: INTERNAL MEDICINE | Facility: OTHER | Age: 67
End: 2022-04-12
Payer: MEDICARE

## 2022-04-12 RX ORDER — ATORVASTATIN CALCIUM 20 MG/1
20 TABLET, FILM COATED ORAL NIGHTLY
Qty: 30 TABLET | Refills: 1 | Status: SHIPPED | OUTPATIENT
Start: 2022-04-12 | End: 2022-06-16 | Stop reason: SDUPTHER

## 2022-04-12 NOTE — TELEPHONE ENCOUNTER
1. Caller Name: Pt                      Call Back Number: [phone] (home)     2. Message: Patient lvm stating it's been 2 weeks that she has been waiting on her atorvastatin and has not been refilled. She is out and would like to know if this is something she should continue taking.    3. Patient approves office to leave a detailed voicemail/MyChart message: N\A

## 2022-04-15 RX ORDER — METOPROLOL SUCCINATE 25 MG/1
1 TABLET, EXTENDED RELEASE ORAL
COMMUNITY
Start: 2022-03-02 | End: 2022-07-19

## 2022-04-15 RX ORDER — ALPRAZOLAM 0.5 MG/1
0.5 TABLET ORAL NIGHTLY PRN
COMMUNITY
End: 2022-04-20 | Stop reason: SDUPTHER

## 2022-04-15 NOTE — TELEPHONE ENCOUNTER
Pt called and left message requesting refill on Xanax, pt states has had 3 family member deaths in a short amount of time, she only has 1 pill left and needs refill. Let pt know I received message and sent to  And will call when I hear back.- Received fax from Pharmacy for Atorvastatin as well    Last seen: 12/6/21 by Dr. Arora  Next appt: 6/1/22 with Dr. Arora    Was the patient seen in the last year in this department? Yes   Does patient have an active prescription for medications requested? No   Received Request Via: Pharmacy

## 2022-04-16 RX ORDER — ATORVASTATIN CALCIUM 20 MG/1
20 TABLET, FILM COATED ORAL NIGHTLY
Qty: 30 TABLET | Refills: 1 | OUTPATIENT
Start: 2022-04-16

## 2022-04-16 RX ORDER — ALPRAZOLAM 0.5 MG/1
0.5 TABLET ORAL NIGHTLY PRN
Qty: 30 TABLET | OUTPATIENT
Start: 2022-04-16

## 2022-04-20 ENCOUNTER — TELEPHONE (OUTPATIENT)
Dept: HOSPITALIST | Facility: MEDICAL CENTER | Age: 67
End: 2022-04-20
Payer: MEDICARE

## 2022-04-20 DIAGNOSIS — F41.1 GENERALIZED ANXIETY DISORDER: ICD-10-CM

## 2022-04-20 RX ORDER — ALPRAZOLAM 0.5 MG/1
0.5 TABLET ORAL NIGHTLY PRN
Qty: 30 TABLET | Refills: 0 | Status: SHIPPED | OUTPATIENT
Start: 2022-04-20 | End: 2022-05-20

## 2022-04-20 NOTE — TELEPHONE ENCOUNTER
Called pharmacy and they let me know they did have refill on the atorvastatin it was just hidden in their system. They will be refilling that rx for her. I then called Lovely to let her know about the meds. I let her know about the xanax. She states she use to get the rx from Dr. Shantell Griffiths from our office. I let her know I would pass this information to Dr. Arora and see what she says

## 2022-06-01 ENCOUNTER — OFFICE VISIT (OUTPATIENT)
Dept: INTERNAL MEDICINE | Facility: OTHER | Age: 67
End: 2022-06-01
Payer: MEDICARE

## 2022-06-01 VITALS
SYSTOLIC BLOOD PRESSURE: 128 MMHG | OXYGEN SATURATION: 98 % | HEIGHT: 61 IN | WEIGHT: 130.8 LBS | DIASTOLIC BLOOD PRESSURE: 79 MMHG | BODY MASS INDEX: 24.7 KG/M2 | HEART RATE: 89 BPM | TEMPERATURE: 99 F

## 2022-06-01 DIAGNOSIS — I10 ESSENTIAL HYPERTENSION: ICD-10-CM

## 2022-06-01 DIAGNOSIS — F41.9 ANXIETY: ICD-10-CM

## 2022-06-01 DIAGNOSIS — R11.0 NAUSEA: ICD-10-CM

## 2022-06-01 DIAGNOSIS — Z12.31 ENCOUNTER FOR SCREENING MAMMOGRAM FOR MALIGNANT NEOPLASM OF BREAST: ICD-10-CM

## 2022-06-01 PROCEDURE — 99213 OFFICE O/P EST LOW 20 MIN: CPT | Mod: GE | Performed by: STUDENT IN AN ORGANIZED HEALTH CARE EDUCATION/TRAINING PROGRAM

## 2022-06-01 RX ORDER — ONDANSETRON 4 MG/1
4 TABLET, FILM COATED ORAL EVERY 6 HOURS PRN
Qty: 12 TABLET | Refills: 0 | Status: SHIPPED | OUTPATIENT
Start: 2022-06-01 | End: 2022-06-04

## 2022-06-01 RX ORDER — CYCLOBENZAPRINE HCL 5 MG
TABLET ORAL
COMMUNITY
Start: 2022-05-26

## 2022-06-01 RX ORDER — PAROXETINE HYDROCHLORIDE 40 MG/1
40 TABLET, FILM COATED ORAL EVERY MORNING
Qty: 90 TABLET | Refills: 0 | Status: SHIPPED | OUTPATIENT
Start: 2022-06-01 | End: 2022-09-01 | Stop reason: SDUPTHER

## 2022-06-01 RX ORDER — LISINOPRIL 20 MG/1
20 TABLET ORAL DAILY
Qty: 90 TABLET | Refills: 0 | Status: SHIPPED | OUTPATIENT
Start: 2022-06-01 | End: 2022-09-28 | Stop reason: SDUPTHER

## 2022-06-01 ASSESSMENT — FIBROSIS 4 INDEX: FIB4 SCORE: 1.3

## 2022-06-01 ASSESSMENT — PATIENT HEALTH QUESTIONNAIRE - PHQ9: CLINICAL INTERPRETATION OF PHQ2 SCORE: 0

## 2022-06-01 NOTE — PROGRESS NOTES
Lovely Palmer is a 67 y.o. female with past medical history of GERD, hypertension, anxiety is here for follow-up of her blood pressure and with complaints of nausea over the weekend.    Gastroenteritis: Patient endorsed nausea, vomiting and loose stools over the last 3 days which has gradually improved in the last 1 day, currently only has mild nausea.  She is able to tolerate p.o. intake without any issues.  She has tested herself for COVID which has been negative thrice over the last week.    Blood pressure: Has been stable off lisinopril for the last 1 week, no associated symptoms.    Mood/anxiety: Patient has been on a stable dose of paroxetine 40 for several years, however over the last few weeks she has been feeling a little low but denies any suicidal ideations or feelings of guilt.  She says she has good rapport with her neighbors who her good friends.  She is willing to try to make some lifestyle changes and will let us know if things get worse at which point she is willing to consider different medication.      Patient is up-to-date with colonoscopy And due for screening mammogram.    Current medicines (including changes today)  Current Outpatient Medications   Medication Sig Dispense Refill   • cyclobenzaprine (FLEXERIL) 5 mg tablet TAKE 1 TABLET BY MOUTH 3 TIMES A DAY FOR 7 DAYS IF NEEDED FOR MUSCLE PAIN/SPASM     • ondansetron (ZOFRAN) 4 MG Tab tablet Take 1 Tablet by mouth every 6 hours as needed for Nausea/Vomiting for up to 3 days. 12 Tablet 0   • lisinopril (PRINIVIL) 20 MG Tab Take 1 Tablet by mouth every day. 90 Tablet 0   • paroxetine (PAXIL) 40 MG tablet Take 1 Tablet by mouth every morning. Indications: Generalized Anxiety Disorder 90 Tablet 0   • metoprolol SR (TOPROL XL) 25 MG TABLET SR 24 HR Take 1 Tablet by mouth every day.     • atorvastatin (LIPITOR) 20 MG Tab Take 1 Tablet by mouth every evening. 30 Tablet 1   • amLODIPine (NORVASC) 5 MG Tab Take 1 Tablet by mouth every morning. 90  Tablet 0   • CALCIUM PO Take 1,000 mg by mouth every morning.     • Multiple Vitamins-Minerals (MULTIVITAMIN GUMMIES WOMENS) Chew Tab Chew 2 Tablets every morning.     • BIOTIN PO Take 2 Tablets by mouth every morning. Gummy chewables.     • acetaminophen (TYLENOL) 500 MG Tab Take 1,000 mg by mouth 1 time a day as needed for Moderate Pain. 2 tablets = 1,000 mg.     • ETHIN ESTRADIOL-NORETHIND ACE 1-5 MG-MCG Tab Take 1 Tablet by mouth every morning.     • omeprazole (PRILOSEC) 40 MG delayed-release capsule Take 1 Capsule by mouth every day. 30 Capsule 0   • metoprolol SR (TOPROL XL) 25 MG TABLET SR 24 HR Take 25 mg by mouth every morning.  5     No current facility-administered medications for this visit.     She  has a past medical history of Anxiety (10/30/2014), Bilateral hand numbness (10/30/2014), GERD (gastroesophageal reflux disease) (10/30/2014), Hiatus hernia syndrome, and Pain (08-04-15).  She  has a past surgical history that includes pr breast augmentation with implant (); pr  delivery only (); hip arthroplasty total (2013); lumbar laminectomy diskectomy (Right, 2015); primary c section; and mammoplasty augmentation.  Social History     Tobacco Use   • Smoking status: Never Smoker   • Smokeless tobacco: Never Used   Vaping Use   • Vaping Use: Never used   Substance Use Topics   • Alcohol use: Yes     Alcohol/week: 2.0 oz     Types: 4 Standard drinks or equivalent per week     Comment: 3 per week or less   • Drug use: No     Social History     Social History Narrative   • Not on file     Family History   Problem Relation Age of Onset   • Stroke Mother         aneurysm age 61 cerebral   • Cancer Father         prostate   • Alcohol abuse Father    • Stroke Other    • Arthritis Sister         RA     Family Status   Relation Name Status   • Mo     • Fa     • OTHER  (Not Specified)   • Sis  (Not Specified)       ROS  See HPI     Objective:     Physical Exam:  BP  "128/79 (BP Location: Right arm, Patient Position: Sitting, BP Cuff Size: Adult)   Pulse 89   Temp 37.2 °C (99 °F) (Temporal)   Ht 1.549 m (5' 1\")   Wt 59.3 kg (130 lb 12.8 oz)   SpO2 98%  Body mass index is 24.71 kg/m².  Constitutional: Alert. Well appearing. No distress.  Skin: Warm, dry, good turgor, no visible rashes.  Eye: Equal, round and reactive to light, conjunctiva clear, lids normal.  ENMT: Moist mucous membranes. Normal dentition. Oropharynx clear.  Neck: Trachea midline, no masses, no thyromegaly. No cervical or supraclavicular lymphadenopathy.  Respiratory: Normal effort. Lungs are clear to auscultation bilaterally.  Cardiovascular: Regular rate and rhythm. Normal S1/S2. No murmurs, rubs or gallops.   Abdomen: Soft, non-tender, non-distended. No masses, no hepatosplenomegaly.  Neuro: Moves all four extremities. No facial droop.  Psych: Answers questions appropriately. Normal affect and mood.      Assessment and Plan:     1. Anxiety  Patient has been on paroxetine 40 for several years, states she has been doing okay, with no suicidal ideations, however of late occasionally she feels not up to it.  Denies any symptoms of depression otherwise.  -Encourage patient to make some lifestyle changes and let us know if things do not improve over the next few weeks, will consider switching over to a different medication at that point.  - paroxetine (PAXIL) 40 MG tablet; Take 1 Tablet by mouth every morning. Indications: Generalized Anxiety Disorder  Dispense: 90 Tablet; Refill: 0    2. Encounter for screening mammogram for malignant neoplasm of breast  - MA-SCREENING MAMMO BILAT W/CAD; Future    3. Essential hypertension  Blood pressure 128/70, patient has been off lisinopril for over a week and has been only using amlodipine.  Denies any other associated symptoms.  -Advised patient to check her blood pressure over the next 5 days to a week and if persistently less than 130/80, she could probably hold off on " the lisinopril and discontinue the amlodipine for now.    4. Nausea  Over the last 4 days patient had nausea, vomiting with 3-4 loose stools.  However since yesterday patient notices improvement with no further episodes of vomiting or diarrhea.  She is continues to feel nauseous.  Most likely possible gastroenteritis which is improving.  -We will prescribe Zofran for nausea, recommend maintaining fluid intake and gradually transitioning diet from clear liquid to regular as tolerated.    Follow up: Return in about 3 months (around 9/1/2022).         PLEASE NOTE: This note was created using voice recognition software.

## 2022-06-01 NOTE — PATIENT INSTRUCTIONS
-You can hold off on lisinopril if home BP readings are consistently <130/80  -take zofran for nausea, if symptoms dont get better or worsen let us know  -f/u in 3 months.

## 2022-06-16 RX ORDER — ATORVASTATIN CALCIUM 20 MG/1
20 TABLET, FILM COATED ORAL NIGHTLY
Qty: 100 TABLET | Refills: 0 | Status: SHIPPED | OUTPATIENT
Start: 2022-06-16 | End: 2022-09-28 | Stop reason: SDUPTHER

## 2022-06-16 NOTE — TELEPHONE ENCOUNTER
Patient's insurance called requesting a 100 day supply on their atorvastatin. Let them know I would send a message to provider so we can get that sent over

## 2022-06-21 ENCOUNTER — OFFICE VISIT (OUTPATIENT)
Dept: INTERNAL MEDICINE | Facility: OTHER | Age: 67
End: 2022-06-21
Payer: MEDICARE

## 2022-06-21 VITALS
HEIGHT: 61 IN | OXYGEN SATURATION: 95 % | BODY MASS INDEX: 24.7 KG/M2 | HEART RATE: 87 BPM | DIASTOLIC BLOOD PRESSURE: 81 MMHG | WEIGHT: 130.8 LBS | SYSTOLIC BLOOD PRESSURE: 126 MMHG | TEMPERATURE: 98 F

## 2022-06-21 DIAGNOSIS — M25.512 ACUTE PAIN OF LEFT SHOULDER: ICD-10-CM

## 2022-06-21 PROCEDURE — 99213 OFFICE O/P EST LOW 20 MIN: CPT | Mod: GE | Performed by: STUDENT IN AN ORGANIZED HEALTH CARE EDUCATION/TRAINING PROGRAM

## 2022-06-21 ASSESSMENT — FIBROSIS 4 INDEX: FIB4 SCORE: 1.3

## 2022-06-21 NOTE — PROGRESS NOTES
Established Patient    Patient Care Team:  Kianna Arora M.D. as PCP - General (Internal Medicine)  Danielle Alonzo M.D. as Consulting Physician (Cardiovascular Disease (Cardiology))  Frankie Lennon M.D. as Consulting Physician (Neurosurgery)    Lovely Palmer is a 67 y.o. female who presents today with the following Chief Complaint(s): Follow up for The encounter diagnosis was Acute pain of left shoulder.    HPI:    67-year-old female came for an office visit to discuss the following    #Left shoulder pain  #Tingling in her left fingers  -2 weeks ago patient woke up with neck muscle sprain, went to ED, was prescribed cyclobenzaprine she did not tolerate well, went to ED again and was prescribed steroid taper which resulted in resolution of symptoms.  But intermittently experiences some discomfort in shoulder with severe exertion also tingling in her left fingers.    No problems updated.     ROS:     Denies any new chest pain or shortness of breath.  No changes to urinary or bowel function. See HPI.    Past Medical History:   Diagnosis Date   • Anxiety 10/30/2014   • Bilateral hand numbness 10/30/2014   • GERD (gastroesophageal reflux disease) 10/30/2014   • Hiatus hernia syndrome    • Pain 08-04-15    lower back, 2/10     Social History     Tobacco Use   • Smoking status: Never Smoker   • Smokeless tobacco: Never Used   Vaping Use   • Vaping Use: Never used   Substance Use Topics   • Alcohol use: Yes     Alcohol/week: 2.0 oz     Types: 4 Standard drinks or equivalent per week     Comment: 3 per week or less   • Drug use: No     Current Outpatient Medications   Medication Sig Dispense Refill   • atorvastatin (LIPITOR) 20 MG Tab Take 1 Tablet by mouth every evening. 100 Tablet 0   • cyclobenzaprine (FLEXERIL) 5 mg tablet TAKE 1 TABLET BY MOUTH 3 TIMES A DAY FOR 7 DAYS IF NEEDED FOR MUSCLE PAIN/SPASM     • lisinopril (PRINIVIL) 20 MG Tab Take 1 Tablet by mouth every day. 90 Tablet 0   • paroxetine  "(PAXIL) 40 MG tablet Take 1 Tablet by mouth every morning. Indications: Generalized Anxiety Disorder 90 Tablet 0   • amLODIPine (NORVASC) 5 MG Tab Take 1 Tablet by mouth every morning. 90 Tablet 0   • CALCIUM PO Take 1,000 mg by mouth every morning.     • Multiple Vitamins-Minerals (MULTIVITAMIN GUMMIES WOMENS) Chew Tab Chew 2 Tablets every morning.     • BIOTIN PO Take 2 Tablets by mouth every morning. Gummy chewables.     • acetaminophen (TYLENOL) 500 MG Tab Take 1,000 mg by mouth 1 time a day as needed for Moderate Pain. 2 tablets = 1,000 mg.     • ETHIN ESTRADIOL-NORETHIND ACE 1-5 MG-MCG Tab Take 1 Tablet by mouth every morning.     • omeprazole (PRILOSEC) 40 MG delayed-release capsule Take 1 Capsule by mouth every day. 30 Capsule 0   • metoprolol SR (TOPROL XL) 25 MG TABLET SR 24 HR Take 25 mg by mouth every morning.  5   • metoprolol SR (TOPROL XL) 25 MG TABLET SR 24 HR Take 1 Tablet by mouth every day. (Patient not taking: Reported on 6/21/2022)       No current facility-administered medications for this visit.     Physical Exam:  /81 (BP Location: Right arm, Patient Position: Sitting, BP Cuff Size: Adult)   Pulse 87   Temp 36.7 °C (98 °F) (Temporal)   Ht 1.549 m (5' 1\")   Wt 59.3 kg (130 lb 12.8 oz)   SpO2 95%   BMI 24.71 kg/m²   General: Well developed, well nourished female, in no distress.  Eyes: Conjuntiva without any obvious injection or erythema.   Cardiovascular: Heart is regular with no murmur  Lungs: Clear to auscultation bilaterally. No wheezes, rhonci or crackles heard. Respiratory effort is normal.  Abd: Soft, non-tender  Ext: No edema, normal active and passive range of motion's in left shoulder, no tenderness elicited on palpation, strength normal in bilateral upper extremities.    Assessment and Plan:   1. Acute pain of left shoulder  -Symptoms almost completely resolved, normal active and passive range of motion with no restriction, no pain or tenderness elicited.  -Regarding her " tingling in left hand fingers, could be related to her neck muscle strain but also patient has a history of degenerative cervical spine disease could be contributing as well, normal strength on physical examination  -Recommended physical therapy, patient already established with physical therapy.  -We will follow-up, if no improvement will consider medications like gabapentin.     Return in about 3 months (around 9/21/2022).  Patient Instructions   -Exercise and balanced nutrition  -Take medications as prescribed  -Follow-up with specialties  Follow up with physical therapy  -Follow-up with PCP as scheduled or return to clinic earlier if any symptoms.

## 2022-06-21 NOTE — PATIENT INSTRUCTIONS
-Exercise and balanced nutrition  -Take medications as prescribed  -Follow-up with specialties  Follow up with physical therapy  -Follow-up with PCP as scheduled or return to clinic earlier if any symptoms.

## 2022-07-11 NOTE — TELEPHONE ENCOUNTER
Last seen: 6/21/22 by Dr. Membreno   Next appt: 07/19/22 with Dr. Mar    Was the patient seen in the last year in this department? Yes   Does patient have an active prescription for medications requested? No   Received Request Via: Pharmacy

## 2022-07-14 RX ORDER — AMLODIPINE BESYLATE 5 MG/1
5 TABLET ORAL EVERY MORNING
Qty: 90 TABLET | Refills: 1 | Status: SHIPPED | OUTPATIENT
Start: 2022-07-14 | End: 2022-07-19

## 2022-07-19 ENCOUNTER — OFFICE VISIT (OUTPATIENT)
Dept: INTERNAL MEDICINE | Facility: OTHER | Age: 67
End: 2022-07-19
Payer: MEDICARE

## 2022-07-19 VITALS
BODY MASS INDEX: 25.19 KG/M2 | DIASTOLIC BLOOD PRESSURE: 68 MMHG | HEART RATE: 76 BPM | WEIGHT: 133.4 LBS | HEIGHT: 61 IN | OXYGEN SATURATION: 97 % | TEMPERATURE: 97.8 F | SYSTOLIC BLOOD PRESSURE: 102 MMHG

## 2022-07-19 DIAGNOSIS — I10 HYPERTENSION, UNSPECIFIED TYPE: ICD-10-CM

## 2022-07-19 DIAGNOSIS — Z76.89 ENCOUNTER TO ESTABLISH CARE WITH NEW DOCTOR: ICD-10-CM

## 2022-07-19 DIAGNOSIS — G89.29 CHRONIC LEFT SHOULDER PAIN: ICD-10-CM

## 2022-07-19 DIAGNOSIS — M25.512 CHRONIC LEFT SHOULDER PAIN: ICD-10-CM

## 2022-07-19 PROBLEM — M54.12 CERVICAL RADICULOPATHY: Status: ACTIVE | Noted: 2019-02-05

## 2022-07-19 PROCEDURE — 99213 OFFICE O/P EST LOW 20 MIN: CPT | Mod: GE

## 2022-07-19 ASSESSMENT — FIBROSIS 4 INDEX: FIB4 SCORE: 1.3

## 2022-07-19 NOTE — PATIENT INSTRUCTIONS
-Follow-up in 1-2 months  -Get labs prior to next appointment  -Continue to monitor blood pressures at home. Morning and evening blood pressures and bring journal in next visit.   -Continue low salt diet and exercise.   -Next visit we can order mammogram and DEXA.   -We will stop metoprolol. To wean, for one week take half a tablet (12.5 mg) and the next week take half of that (quarter: 6.25 mg).   -Follow-up with sports medicine.  -It was nice visiting with you today!

## 2022-07-21 NOTE — PROGRESS NOTES
Established Patient    Patient Care Team:  Pamela Mar M.D. as PCP - General (Internal Medicine)  Danielle Alonzo M.D. as Consulting Physician (Cardiovascular Disease (Cardiology))  Frankie Lennon M.D. as Consulting Physician (Neurosurgery)    Lovely Palmer is a 67 y.o. female who presents today with the following Chief Complaint(s): Follow up for Diagnoses of Hypertension, unspecified type, Chronic left shoulder pain, and Encounter to establish care with new doctor were pertinent to this visit.    HPI:  Patient is a pleasant 67-year-old female with past medical history of hypertension, dyslipidemia, anxiety, GERD who presents to the clinic today for follow-up on chronic left shoulder pain and hypertension.    Patient has chronic left shoulder pain, which has recently worsened causing her to visit urgent care twice.  Denies any trauma or injury.  Pain is worse with certain movements.  Sometimes will experience tingling in her fingers.  First time she was prescribed muscle relaxant which did not help and the second time will prescribe steroids which did help symptoms.  She has seen physical therapy in the past and states it is not helpful.  Next week sees sports medicine, Dr. Geiger.     Currently on amlodipine 5 mg, lisinopril 20 mg and metoprolol 25 mg for blood pressure.  Blood pressures have been averaging 100s/60s.  Denies any headache, blurry vision, chest pain, palpitations, shortness of breath or numbness/tingling.  No history of coronary artery disease or stroke.  In the past followed by cardiologist, Dr. Alonzo for high blood pressure.  States diet low in sodium.  Exercise level is mild to moderate.  Drinks 3 alcoholic mixed drinks weekly.    ROS:     General: No fevers, chills, night sweats, weight loss or gain  HEENT: No hearing changes, vision changes, eye pain, ear pain, nasal discharge, sore throat  Neck: No swelling in neck  Pulmonary: No shortness of breath, cough, sputum,  or hemoptysis  Cardiovascular: No chest pain, palpitations, or LE swelling  GI: No nausea, vomiting, diarrhea, constipation, abdominal pain, hematochezia or melena  : No dysuria or frequency  Neuro: No focal weakness, no general weakness, no headaches, no lightheadedness, no dizziness  Psych: No anxiety or depression    Past Medical History:   Diagnosis Date   • Anxiety 10/30/2014   • Bilateral hand numbness 10/30/2014   • GERD (gastroesophageal reflux disease) 10/30/2014   • Hiatus hernia syndrome    • Pain 08-04-15    lower back, 2/10     Social History     Tobacco Use   • Smoking status: Never Smoker   • Smokeless tobacco: Never Used   Vaping Use   • Vaping Use: Never used   Substance Use Topics   • Alcohol use: Yes     Alcohol/week: 1.8 oz     Types: 3 Shots of liquor per week     Comment: 3 per week or less   • Drug use: No     Current Outpatient Medications   Medication Sig Dispense Refill   • atorvastatin (LIPITOR) 20 MG Tab Take 1 Tablet by mouth every evening. 100 Tablet 0   • cyclobenzaprine (FLEXERIL) 5 mg tablet TAKE 1 TABLET BY MOUTH 3 TIMES A DAY FOR 7 DAYS IF NEEDED FOR MUSCLE PAIN/SPASM     • lisinopril (PRINIVIL) 20 MG Tab Take 1 Tablet by mouth every day. 90 Tablet 0   • paroxetine (PAXIL) 40 MG tablet Take 1 Tablet by mouth every morning. Indications: Generalized Anxiety Disorder 90 Tablet 0   • CALCIUM PO Take 1,000 mg by mouth every morning.     • Multiple Vitamins-Minerals (MULTIVITAMIN GUMMIES WOMENS) Chew Tab Chew 2 Tablets every morning.     • BIOTIN PO Take 2 Tablets by mouth every morning. Gummy chewables.     • acetaminophen (TYLENOL) 500 MG Tab Take 1,000 mg by mouth 1 time a day as needed for Moderate Pain. 2 tablets = 1,000 mg.     • ETHIN ESTRADIOL-NORETHIND ACE 1-5 MG-MCG Tab Take 1 Tablet by mouth every morning.     • omeprazole (PRILOSEC) 40 MG delayed-release capsule Take 1 Capsule by mouth every day. 30 Capsule 0   • metoprolol SR (TOPROL XL) 25 MG TABLET SR 24 HR Take 25 mg by  "mouth every morning.  5     No current facility-administered medications for this visit.       Physical Exam:  /68 (BP Location: Left arm, Patient Position: Sitting, BP Cuff Size: Adult)   Pulse 76   Temp 36.6 °C (97.8 °F) (Temporal)   Ht 1.549 m (5' 1\")   Wt 60.5 kg (133 lb 6.4 oz)   SpO2 97%   BMI 25.21 kg/m²   General: Well developed, well nourished female, in no distress.  HEENT: NC/AT, PERRL, EOMI, no scleral icterus or conjunctival pallor   Neck: Supple, No cervical or supraclavicular LAD  CV:RRR, no murmurs gallops or Rubs  Pulm: LCAB, no crackles, rales, rhonchi, or wheezing  GI: Normal bowel sounds, abdomen soft, nontender, nondistended to deep or light palpation in all 4 quadrants, no HSM.  MSK: Radial pulses 2+ and equal bilaterally.  Strength 5 out of 5 in upper and lower extremities.  No lower extremity edema.  Left shoulder no visible bruising, edema or deformity.  No tenderness to palpation.  Normal range of motion.  Strength 5 out of 5.  Neuro: Patient is alert and oriented x3, no focal deficits  Psych: Appropriate mood and affect       Assessment and Plan:   1. Chronic left shoulder pain  Symptoms improving  Physical exam of left shoulder within normal limits  Patient following up with sports medicine  -Continue conservative treatment with heat/ice, topical muscle rub, Tylenol for pain and shoulder stretches  - DX-ARTHROGRAM-SHOULDER LEFT; Future  -Follow-up with sports medicine    2. Hypertension, unspecified type  BP in clinic 102/68, goal <130/80  Currently on amlodipine 5 mg, lisinopril 20 mg and metoprolol 25 mg  -Patient with no coronary artery disease.  We will discontinue metoprolol with 2-week wean.  Patient to take half a tablet for 1 week and then a quarter of the tablet for another week and stop.  -Continue amlodipine and lisinopril  -Continue low-salt diet and exercise  -Discussed with patient on monitoring blood pressures at home and monitor twice daily and bring journal in " next visit  -Ordered CBC, CMP, lipid profile, vitamin D and microalbumin creatinine ratio  -Follow-up in 2 months    3. Encounter to establish care with new doctor  -We will address health maintenance at a future appointment  -Ordered CBC, CMP, lipid profile, vitamin D and microalbumin creatinine ratio  -Follow-up in 2 months    Return in about 2 months (around 9/19/2022).    Patient Instructions   -Follow-up in 1-2 months  -Get labs prior to next appointment  -Continue to monitor blood pressures at home. Morning and evening blood pressures and bring journal in next visit.   -Continue low salt diet and exercise.   -Next visit we can order mammogram and DEXA.   -We will stop metoprolol. To wean, for one week take half a tablet (12.5 mg) and the next week take half of that (quarter: 6.25 mg).   -Follow-up with sports medicine.  -It was nice visiting with you today!      Pamela Mar M.D. PGY-1  [facility]    This note was created using voice recognition software.  While every attempt is made to ensure accuracy of transcription, occasionally errors occur.

## 2022-09-01 DIAGNOSIS — F41.9 ANXIETY: ICD-10-CM

## 2022-09-01 NOTE — TELEPHONE ENCOUNTER
Last seen: 07.19.2022 by Dr. Mar  Next appt: 09.06.2022 with Dr. Mar    Was the patient seen in the last year in this department? Yes   Does patient have an active prescription for medications requested? No   Received Request Via: Pharmacy

## 2022-09-01 NOTE — TELEPHONE ENCOUNTER
Please call him. I discontinue Anoro as requested due to cost. I sent a prescription for Duoneb nebulizer solutions to [facility] because this is the only medication that they will pay for. Does he already have a nebulizer machine? If not I have written a prescription for a Nebulizer machine and sent that to [facility]. If he already has one then cancel my order for the nebulizer machine and supplies.   Pt is requesting a refill of paroxetine 40 mg.

## 2022-09-06 ENCOUNTER — APPOINTMENT (OUTPATIENT)
Dept: INTERNAL MEDICINE | Facility: OTHER | Age: 67
End: 2022-09-06
Payer: MEDICARE

## 2022-09-13 RX ORDER — PAROXETINE HYDROCHLORIDE 40 MG/1
40 TABLET, FILM COATED ORAL EVERY MORNING
Qty: 90 TABLET | Refills: 6 | Status: SHIPPED | OUTPATIENT
Start: 2022-09-13 | End: 2022-10-03 | Stop reason: SDUPTHER

## 2022-09-28 DIAGNOSIS — F41.9 ANXIETY: ICD-10-CM

## 2022-09-29 NOTE — TELEPHONE ENCOUNTER
Last seen: 7.19.22 by Dr. Mar  Next appt: 10.10.22 with Dr. Mar    Was the patient seen in the last year in this department? Yes   Does patient have an active prescription for medications requested? No   Received Request Via: Pharmacy

## 2022-10-03 RX ORDER — LISINOPRIL 20 MG/1
20 TABLET ORAL DAILY
Qty: 90 TABLET | Refills: 0 | Status: SHIPPED | OUTPATIENT
Start: 2022-10-03 | End: 2022-12-29 | Stop reason: SDUPTHER

## 2022-10-03 RX ORDER — ATORVASTATIN CALCIUM 20 MG/1
20 TABLET, FILM COATED ORAL NIGHTLY
Qty: 100 TABLET | Refills: 0 | Status: SHIPPED | OUTPATIENT
Start: 2022-10-03 | End: 2023-01-10

## 2022-10-03 RX ORDER — PAROXETINE HYDROCHLORIDE 40 MG/1
40 TABLET, FILM COATED ORAL EVERY MORNING
Qty: 90 TABLET | Refills: 6 | Status: SHIPPED | OUTPATIENT
Start: 2022-10-03 | End: 2023-12-15

## 2022-10-03 NOTE — TELEPHONE ENCOUNTER
Patient called for an update on medication refills. She states she is completely out of Lisinopril.

## 2022-10-10 ENCOUNTER — OFFICE VISIT (OUTPATIENT)
Dept: INTERNAL MEDICINE | Facility: OTHER | Age: 67
End: 2022-10-10
Payer: MEDICARE

## 2022-10-10 VITALS
HEART RATE: 110 BPM | SYSTOLIC BLOOD PRESSURE: 123 MMHG | HEIGHT: 61 IN | OXYGEN SATURATION: 96 % | BODY MASS INDEX: 25.3 KG/M2 | DIASTOLIC BLOOD PRESSURE: 81 MMHG | TEMPERATURE: 98.3 F | WEIGHT: 134 LBS

## 2022-10-10 DIAGNOSIS — I10 HYPERTENSION, UNSPECIFIED TYPE: ICD-10-CM

## 2022-10-10 DIAGNOSIS — R00.0 SINUS TACHYCARDIA: ICD-10-CM

## 2022-10-10 DIAGNOSIS — Z23 FLU VACCINE NEED: ICD-10-CM

## 2022-10-10 PROBLEM — D75.89 MACROCYTOSIS: Status: RESOLVED | Noted: 2019-06-20 | Resolved: 2022-10-10

## 2022-10-10 PROBLEM — R74.8 ABNORMAL LIVER ENZYMES: Status: RESOLVED | Noted: 2019-10-08 | Resolved: 2022-10-10

## 2022-10-10 PROBLEM — Z00.00 PREVENTATIVE HEALTH CARE: Status: RESOLVED | Noted: 2017-04-26 | Resolved: 2022-10-10

## 2022-10-10 PROCEDURE — G0008 ADMIN INFLUENZA VIRUS VAC: HCPCS | Performed by: INTERNAL MEDICINE

## 2022-10-10 PROCEDURE — 90662 IIV NO PRSV INCREASED AG IM: CPT | Performed by: INTERNAL MEDICINE

## 2022-10-10 PROCEDURE — 99213 OFFICE O/P EST LOW 20 MIN: CPT | Mod: 25,GC

## 2022-10-10 PROCEDURE — 93000 ELECTROCARDIOGRAM COMPLETE: CPT | Mod: GC

## 2022-10-10 RX ORDER — METOPROLOL SUCCINATE 25 MG/1
25 TABLET, EXTENDED RELEASE ORAL EVERY MORNING
Qty: 30 TABLET | Refills: 5 | Status: SHIPPED | OUTPATIENT
Start: 2022-10-10 | End: 2023-09-18

## 2022-10-10 RX ORDER — METOPROLOL SUCCINATE 25 MG/1
25 TABLET, EXTENDED RELEASE ORAL EVERY MORNING
Qty: 30 TABLET | Refills: 5 | Status: SHIPPED | OUTPATIENT
Start: 2022-10-10 | End: 2022-10-10 | Stop reason: SDUPTHER

## 2022-10-10 ASSESSMENT — FIBROSIS 4 INDEX: FIB4 SCORE: 1.39

## 2022-10-10 NOTE — PATIENT INSTRUCTIONS
-Follow-up in 1 week   -Will restart metoprolol. I sent prescription to pharmacy  -Ordered ECHO. Get done when applicable.  -Continue to monitor blood pressures  -It was nice visiting with you today!

## 2022-10-11 PROBLEM — Z96.642 HISTORY OF LEFT HIP REPLACEMENT: Status: RESOLVED | Noted: 2019-10-08 | Resolved: 2022-10-11

## 2022-10-11 PROBLEM — M79.642 LEFT HAND PAIN: Status: RESOLVED | Noted: 2019-03-26 | Resolved: 2022-10-11

## 2022-10-11 PROBLEM — E78.5 DYSLIPIDEMIA: Status: RESOLVED | Noted: 2019-10-08 | Resolved: 2022-10-11

## 2022-10-11 NOTE — PROGRESS NOTES
Established Patient    Patient Care Team:  Pamela Mar M.D. as PCP - General (Internal Medicine)  Danielle Alonzo M.D. as Consulting Physician (Cardiovascular Disease (Cardiology))  Frankie Lennon M.D. as Consulting Physician (Neurosurgery)    Lovely Palmer is a 67 y.o. female who presents today with the following Chief Complaint(s): Follow up for Diagnoses of Hypertension, unspecified type, Sinus tachycardia, and Flu vaccine need were pertinent to this visit.    HPI:  Patient is a pleasant 67-year-old female with past medical history of hypertension, anxiety, GERD who presents to the clinic today for follow-up on hypertension and flu vaccine.    Patient currently taking lisinopril 20 mg daily.  Tolerating well with no side effects.  Blood pressures at home have been between 130s-140s/80s-90s.  Denies any headaches, lightheadedness, dizziness, blurry vision, chest pain, palpitations, shortness of breath or numbness and tingling.  Patient reports in the past that she is followed by cardiologist, Dr. Alonzo for high blood pressure.  States she continues to use a diet low in sodium and exercises a couple times a week.  Drinks 3 alcoholic mixed drinks weekly. CBC, CMP, lipid panel, vitamin D and microalbumin to creatinine ratio within normal limits.    Patient wanting to receive this years flu vaccine in clinic.    Also, patient's pulse noted to be 110.  Patient denies any recent illness or changes in anxiety.  Denies any symptoms at this time.  No history of coronary artery disease.  Drinks 2 cups of coffee in the morning daily.      ROS:     General: No fevers, chills, night sweats, weight loss or gain  HEENT: No hearing changes, vision changes, eye pain, ear pain, nasal discharge, sore throat  Neck: No swelling in neck  Pulmonary: No shortness of breath, cough, sputum, or hemoptysis  Cardiovascular: No chest pain, palpitations, or LE swelling  GI: No nausea, vomiting, diarrhea,  constipation, abdominal pain, hematochezia or melena  : No dysuria or frequency  Neuro: No focal weakness, no general weakness, no headaches, no lightheadedness, no dizziness  Psych: No anxiety or depression    Past Medical History:   Diagnosis Date    Anxiety 10/30/2014    Bilateral hand numbness 10/30/2014    Dyslipidemia 10/8/2019    GERD (gastroesophageal reflux disease) 10/30/2014    Hiatus hernia syndrome     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 Sequeira    History of left hip replacement 10/8/2019    Pain 08-04-15    lower back, 2/10     Social History     Tobacco Use    Smoking status: Never    Smokeless tobacco: Never   Vaping Use    Vaping Use: Never used   Substance Use Topics    Alcohol use: Yes     Alcohol/week: 1.8 oz     Types: 3 Shots of liquor per week     Comment: 3 per week or less    Drug use: No     Current Outpatient Medications   Medication Sig Dispense Refill    metoprolol SR (TOPROL XL) 25 MG TABLET SR 24 HR Take 1 Tablet by mouth every morning. 30 Tablet 5    atorvastatin (LIPITOR) 20 MG Tab Take 1 Tablet by mouth every evening. 100 Tablet 0    lisinopril (PRINIVIL) 20 MG Tab Take 1 Tablet by mouth every day. 90 Tablet 0    paroxetine (PAXIL) 40 MG tablet Take 1 Tablet by mouth every morning. Indications: Generalized Anxiety Disorder 90 Tablet 6    cyclobenzaprine (FLEXERIL) 5 mg tablet TAKE 1 TABLET BY MOUTH 3 TIMES A DAY FOR 7 DAYS IF NEEDED FOR MUSCLE PAIN/SPASM      CALCIUM PO Take 1,000 mg by mouth every morning.      BIOTIN PO Take 2 Tablets by mouth every morning. Gummy chewables.      ETHIN ESTRADIOL-NORETHIND ACE 1-5 MG-MCG Tab Take 1 Tablet by mouth every morning.      omeprazole (PRILOSEC) 40 MG delayed-release capsule Take 1 Capsule by mouth every day. 30 Capsule 0    Multiple Vitamins-Minerals (MULTIVITAMIN GUMMIES WOMENS) Chew Tab Chew 2 Tablets every morning. (Patient not taking: Reported on 10/10/2022)       "acetaminophen (TYLENOL) 500 MG Tab Take 1,000 mg by mouth 1 time a day as needed for Moderate Pain. 2 tablets = 1,000 mg. (Patient not taking: Reported on 10/10/2022)       No current facility-administered medications for this visit.       Physical Exam:  /81 (BP Location: Left arm, Patient Position: Sitting, BP Cuff Size: Adult)   Pulse (!) 110   Temp 36.8 °C (98.3 °F) (Temporal)   Ht 1.549 m (5' 1\")   Wt 60.8 kg (134 lb)   SpO2 96%   BMI 25.32 kg/m²   General: Well developed, well nourished female, in no distress.  HEENT: NC/AT, PERRL, EOMI, no scleral icterus or conjunctival pallor  Neck: Supple, No cervical or supraclavicular LAD  CV: Tachycardic with regular rhythm, no murmurs gallops or Rubs, no JVD  Pulm: LCAB, no crackles, rales, rhonchi, or wheezing  MSK: Radial pulses 2+ and equal bilaterally.  No lower extremity edema  Neuro: Patient is alert and oriented x3, no focal deficits  Psych: Appropriate mood and affect       Assessment and Plan:   1. Hypertension, unspecified type  BP in clinic 123/81, goal <130/80  Currently on lisinopril 20 mg  -Will restart metoprolol 25 mg daily to control pulse  -Continue low-salt diet and exercise  -Discussed with patient to continue monitoring blood pressures at home  -Follow-up in 1 week to recheck blood pressure    2. Sinus tachycardia  Uncertain etiology  CBC and CMP within normal limits  - TSH+FREE T4  -Will restart metoprolol 25 mg daily  - EKG - Clinic Performed, showed sinus tachycardia with rate of 102 bpm  - EC-ECHOCARDIOGRAM COMPLETE W/O CONT; Future, last 1 done in 2012  -Follow-up in 1 week to recheck pulse    3. Flu vaccine need  -Given flu vaccine in clinic      Return in about 1 week (around 10/17/2022).    Patient Instructions   -Follow-up in 1 week   -Will restart metoprolol. I sent prescription to pharmacy  -Ordered ECHO. Get done when applicable.  -Continue to monitor blood pressures  -It was nice visiting with you today!      Pamela" RAVINDRA Mar. PGY-2  [facility]    This note was created using voice recognition software.  While every attempt is made to ensure accuracy of transcription, occasionally errors occur.

## 2022-10-19 ENCOUNTER — OFFICE VISIT (OUTPATIENT)
Dept: INTERNAL MEDICINE | Facility: OTHER | Age: 67
End: 2022-10-19
Payer: MEDICARE

## 2022-10-19 VITALS
HEART RATE: 89 BPM | BODY MASS INDEX: 25.49 KG/M2 | DIASTOLIC BLOOD PRESSURE: 84 MMHG | WEIGHT: 135 LBS | SYSTOLIC BLOOD PRESSURE: 121 MMHG | TEMPERATURE: 97.8 F | OXYGEN SATURATION: 96 % | HEIGHT: 61 IN

## 2022-10-19 DIAGNOSIS — I10 PRIMARY HYPERTENSION: ICD-10-CM

## 2022-10-19 DIAGNOSIS — I47.11 INAPPROPRIATE SINUS TACHYCARDIA (HCC): ICD-10-CM

## 2022-10-19 PROCEDURE — 99213 OFFICE O/P EST LOW 20 MIN: CPT | Mod: GE

## 2022-10-19 ASSESSMENT — FIBROSIS 4 INDEX: FIB4 SCORE: 1.39

## 2022-10-19 NOTE — PATIENT INSTRUCTIONS
-Continue medications  -Schedule annual wellness visit  -Continue to monitor blood pressures  -It was nice visiting with you today!

## 2022-10-20 NOTE — PROGRESS NOTES
Established Patient    Patient Care Team:  Pamela Mar M.D. as PCP - General (Internal Medicine)  Danielle Alonzo M.D. as Consulting Physician (Cardiovascular Disease (Cardiology))  Frankie Lennon M.D. as Consulting Physician (Neurosurgery)    Lovely Palmer is a 67 y.o. female who presents today with the following Chief Complaint(s): Follow up for Diagnoses of Primary hypertension and Inappropriate sinus tachycardia were pertinent to this visit.    HPI:  Patient is a pleasant 67-year-old female with past medical history of hypertension, anxiety, GERD who presents to the clinic today for follow-up on hypertension and tachycardia.     Patient currently taking lisinopril 20 mg daily and metoprolol 25 mg daily.  Tolerating well with no side effects.  Blood pressures at home have been around  120s/80s.  Denies any headaches, lightheadedness, dizziness, blurry vision, chest pain, palpitations, shortness of breath, numbness/tingling or weakness.  Patient has not yet got lab work done for TSH or scheduled ECHO.      Patient's pulse noted to be 89 today in clinic. Restarted metoprolol 25 mg daily last visit. Denies any symptoms. No history of coronary artery disease.  Drinks 2 cups of coffee in the morning daily.    ROS:     General: No fevers, chills, night sweats, weight loss or gain  HEENT: No hearing changes, vision changes, eye pain, ear pain, nasal discharge, sore throat  Neck: No swelling in neck  Pulmonary: No shortness of breath, cough, sputum, or hemoptysis  Cardiovascular: No chest pain, palpitations, or LE swelling  GI: No nausea, vomiting, diarrhea, constipation, abdominal pain, hematochezia or melena  : No dysuria or frequency  Neuro: No focal weakness, no general weakness, no headaches, no lightheadedness, no dizziness  Psych: No anxiety or depression    Past Medical History:   Diagnosis Date    Anxiety 10/30/2014    Bilateral hand numbness 10/30/2014    Dyslipidemia 10/8/2019    GERD  (gastroesophageal reflux disease) 10/30/2014    Hiatus hernia syndrome     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 Sequeira    History of left hip replacement 10/8/2019    Pain 08-04-15    lower back, 2/10     Social History     Tobacco Use    Smoking status: Never    Smokeless tobacco: Never   Vaping Use    Vaping Use: Never used   Substance Use Topics    Alcohol use: Yes     Alcohol/week: 1.8 oz     Types: 3 Shots of liquor per week     Comment: 3 per week or less    Drug use: No     Current Outpatient Medications   Medication Sig Dispense Refill    metoprolol SR (TOPROL XL) 25 MG TABLET SR 24 HR Take 1 Tablet by mouth every morning. 30 Tablet 5    atorvastatin (LIPITOR) 20 MG Tab Take 1 Tablet by mouth every evening. 100 Tablet 0    lisinopril (PRINIVIL) 20 MG Tab Take 1 Tablet by mouth every day. 90 Tablet 0    paroxetine (PAXIL) 40 MG tablet Take 1 Tablet by mouth every morning. Indications: Generalized Anxiety Disorder 90 Tablet 6    cyclobenzaprine (FLEXERIL) 5 mg tablet TAKE 1 TABLET BY MOUTH 3 TIMES A DAY FOR 7 DAYS IF NEEDED FOR MUSCLE PAIN/SPASM      CALCIUM PO Take 1,000 mg by mouth every morning.      Multiple Vitamins-Minerals (MULTIVITAMIN GUMMIES WOMENS) Chew Tab Chew 2 Tablets every morning.      BIOTIN PO Take 2 Tablets by mouth every morning. Gummy chewables.      ETHIN ESTRADIOL-NORETHIND ACE 1-5 MG-MCG Tab Take 1 Tablet by mouth every morning.      omeprazole (PRILOSEC) 40 MG delayed-release capsule Take 1 Capsule by mouth every day. 30 Capsule 0    acetaminophen (TYLENOL) 500 MG Tab Take 1,000 mg by mouth 1 time a day as needed for Moderate Pain. 2 tablets = 1,000 mg. (Patient not taking: Reported on 10/19/2022)       No current facility-administered medications for this visit.       Physical Exam:  /84 (BP Location: Left arm, Patient Position: Sitting, BP Cuff Size: Adult)   Pulse 89   Temp 36.6 °C (97.8 °F) (Temporal)   Ht 1.549 m  "(5' 1\")   Wt 61.2 kg (135 lb)   SpO2 96%   BMI 25.51 kg/m²   General: Well developed, well nourished female, in no distress.  HEENT: NC/AT, PERRL, EOMI, no scleral icterus or conjunctival pallor  Neck: Supple, No cervical or supraclavicular LAD  CV:RRR, no murmurs gallops or Rubs, no JVD  Pulm: LCAB, no crackles, rales, rhonchi, or wheezing  GI: Normal bowel sounds, abdomen soft, nontender, nondistended to deep or light palpation in all 4 quadrants, no HSM.  MSK: Radial pulses 2+ and equal bilaterally. No lower extremity edema  Neuro: Patient is alert and oriented x3, no focal deficits  Psych: Appropriate mood and affect       Assessment and Plan:   1. Primary hypertension  Controlled  BP in clinic 121/84  Currently on lisinopril 20 mg and metoprolol 25 mg daily   -Continue low-salt diet and exercise  -Discussed with patient to continue monitoring blood pressures at home  -Follow-up in 3 months for annual wellness exam     2. Inappropriate sinus tachycardia  Pulse 89 in clinic   Uncertain etiology  -Restarted metoprolol 25 mg daily last visit   - Pending TSH+FREE T4  - Pending EC-ECHOCARDIOGRAM COMPLETE W/O CONT; Future, last 1 done in 2012  -Follow-up in 1 month       Return in about 1 month (around 11/19/2022).    Patient Instructions   -Continue medications  -Schedule annual wellness visit  -Continue to monitor blood pressures  -It was nice visiting with you today!         Pamela Mar M.D. PGY-2  [facility]    This note was created using voice recognition software.  While every attempt is made to ensure accuracy of transcription, occasionally errors occur.    "

## 2022-11-11 ENCOUNTER — PATIENT MESSAGE (OUTPATIENT)
Dept: HEALTH INFORMATION MANAGEMENT | Facility: OTHER | Age: 67
End: 2022-11-11

## 2022-11-15 ENCOUNTER — APPOINTMENT (RX ONLY)
Dept: URBAN - METROPOLITAN AREA CLINIC 6 | Facility: CLINIC | Age: 67
Setting detail: DERMATOLOGY
End: 2022-11-15

## 2022-11-15 DIAGNOSIS — L82.0 INFLAMED SEBORRHEIC KERATOSIS: ICD-10-CM

## 2022-11-15 PROCEDURE — ? LIQUID NITROGEN

## 2022-11-15 PROCEDURE — 17110 DESTRUCTION B9 LES UP TO 14: CPT

## 2022-11-15 PROCEDURE — ? COUNSELING

## 2022-11-15 ASSESSMENT — LOCATION SIMPLE DESCRIPTION DERM: LOCATION SIMPLE: RIGHT FOREHEAD

## 2022-11-15 ASSESSMENT — LOCATION ZONE DERM: LOCATION ZONE: FACE

## 2022-11-15 ASSESSMENT — LOCATION DETAILED DESCRIPTION DERM: LOCATION DETAILED: RIGHT INFERIOR MEDIAL FOREHEAD

## 2022-11-15 NOTE — PROCEDURE: MIPS QUALITY
Quality 130: Documentation Of Current Medications In The Medical Record: Current Medications Documented
Detail Level: Detailed
Quality 226: Preventive Care And Screening: Tobacco Use: Screening And Cessation Intervention: Patient screened for tobacco use and is an ex/non-smoker
Quality 431: Preventive Care And Screening: Unhealthy Alcohol Use - Screening: Patient not identified as an unhealthy alcohol user when screened for unhealthy alcohol use using a systematic screening method
Quality 111:Pneumonia Vaccination Status For Older Adults: Pneumococcal vaccine (PPSV23) administered on or after patient’s 60th birthday and before the end of the measurement period

## 2022-11-15 NOTE — PROCEDURE: LIQUID NITROGEN
Spray Paint Text: The liquid nitrogen was applied to the skin utilizing a spray paint frosting technique.
Add 52 Modifier (Optional): no
Consent: The patient's consent was obtained including but not limited to risks of crusting, scabbing, blistering, scarring, darker or lighter pigmentary change, recurrence, incomplete removal and infection.
Show Topical Anesthesia Variable?: Yes
Medical Necessity Clause: This procedure was medically necessary because the lesions that were treated were:
Detail Level: Detailed
Medical Necessity Information: It is in your best interest to select a reason for this procedure from the list below. All of these items fulfill various CMS LCD requirements except the new and changing color options.
Post-Care Instructions: I reviewed with the patient in detail post-care instructions. Patient is to wear sunprotection, and avoid picking at any of the treated lesions. Pt may apply Vaseline to crusted or scabbing areas.

## 2022-12-29 RX ORDER — LISINOPRIL 20 MG/1
20 TABLET ORAL DAILY
Qty: 90 TABLET | Refills: 3 | Status: SHIPPED | OUTPATIENT
Start: 2022-12-29 | End: 2023-04-27 | Stop reason: SDUPTHER

## 2022-12-29 NOTE — TELEPHONE ENCOUNTER
Received request via: Patient    Was the patient seen in the last year in this department? Yes    Does the patient have an active prescription (recently filled or refills available) for medication(s) requested? No    Does the patient have alf Plus and need 100 day supply (blood pressure, diabetes and cholesterol meds only)? Patient does not have SCP

## 2023-04-27 NOTE — TELEPHONE ENCOUNTER
Received request via: Pharmacy    Was the patient seen in the last year in this department? Yes    Does the patient have an active prescription (recently filled or refills available) for medication(s) requested? Yes, but [facility] is requesting a 100 day supply for increased convenience and to promote adherence.     Does the patient have custodial Plus and need 100 day supply (blood pressure, diabetes and cholesterol meds only)? No but [facility] is requesting a 100 day supply for increased convenience and to promote adherence.

## 2023-04-29 RX ORDER — LISINOPRIL 20 MG/1
20 TABLET ORAL DAILY
Qty: 90 TABLET | Refills: 3 | Status: SHIPPED | OUTPATIENT
Start: 2023-04-29

## 2023-06-06 ENCOUNTER — APPOINTMENT (RX ONLY)
Dept: URBAN - METROPOLITAN AREA CLINIC 35 | Facility: CLINIC | Age: 68
Setting detail: DERMATOLOGY
End: 2023-06-06

## 2023-06-06 DIAGNOSIS — Z41.9 ENCOUNTER FOR PROCEDURE FOR PURPOSES OTHER THAN REMEDYING HEALTH STATE, UNSPECIFIED: ICD-10-CM

## 2023-06-06 PROCEDURE — ? FILLERS

## 2023-06-06 NOTE — PROCEDURE: FILLERS
Additional Area 4 Location: Scars
Additional Area 4 Volume In Cc: 0
Additional Area 2 Location: Border of lips
Include Cannula Information In Note?: No
Map Statment: See Attach Map for Complete Details
Detail Level: Detailed
Lot #: [id number]
Additional Area 5 Location: Earlobes
Additional Area 3 Location: Fine lines around mouth
Additional Area 1 Location: Oral Commisures
Topical Anesthesia?: 23% lidocaine, 7% tetracaine
Lot #: [id number]
Expiration Date (Month Year): 2023-12-23
Filler: Juvederm Ultra XC
Consent: Written consent obtained. Risks include but not limited to bruising, bleeding, blindness, stroke, delayed onset of nodules, irregular texture, ulceration, infection, allergic reaction, scar formation, incomplete augmentation, temporary nature, procedural pain.
Expiration Date (Month Year): 2023-11-13
Post-Care Instructions: Patient instructed to apply ice to reduce swelling.
Price (Use Numbers Only, No Special Characters Or $): 6359
Filler: Juvederm Voluma XC
Use Map Statement For Sites (Optional): Yes
Filler Comments: Juvederm Ultra XC 1.0 ml
Filler Comments: Voluma XC 1.0 ml
Aspiration Statement: Aspiration was performed prior to injecting site with filler.

## 2023-09-06 ENCOUNTER — APPOINTMENT (RX ONLY)
Dept: URBAN - METROPOLITAN AREA CLINIC 35 | Facility: CLINIC | Age: 68
Setting detail: DERMATOLOGY
End: 2023-09-06

## 2023-09-06 DIAGNOSIS — Z41.9 ENCOUNTER FOR PROCEDURE FOR PURPOSES OTHER THAN REMEDYING HEALTH STATE, UNSPECIFIED: ICD-10-CM

## 2023-09-06 PROCEDURE — ? FILLERS

## 2023-09-06 NOTE — PROCEDURE: FILLERS
Additional Area 4 Location: Scars
Additional Area 4 Volume In Cc: 0
Additional Area 2 Location: Border of lips
Include Cannula Information In Note?: No
Map Statment: See Attach Map for Complete Details
Detail Level: Detailed
Lot #: [id number]
Additional Area 5 Location: Earlobes
Include Cannula Length?: 1.5 inch
Additional Area 3 Location: Fine lines around mouth
Additional Area 1 Location: Oral Commisures
Include Cannula Size?: 25G
Topical Anesthesia?: 23% lidocaine, 7% tetracaine
Lot #: [id number]
Expiration Date (Month Year): 2024-03-22
Consent: Written consent obtained. Risks include but not limited to bruising, bleeding, blindness, stroke, delayed onset of nodules, irregular texture, ulceration, infection, allergic reaction, scar formation, incomplete augmentation, temporary nature, procedural pain.
Expiration Date (Month Year): 2023-11-13
Post-Care Instructions: Patient instructed to apply ice to reduce swelling.
Include Cannula Information In Note?: Yes
Price (Use Numbers Only, No Special Characters Or $): 6811
Filler: Juvederm Voluma XC
Filler Comments: Juvederm Ultra XC 1.0 ml
Filler Comments: Voluma XC  4 (4.0 ml) \\n\\n0.3 cc/ side, temples \\n0.7 cc/ side, mandibles \\n\\n0.5 cc/ side, chin \\n\\n0.5 cc/ side, preauricular cheeks \\n\\Kaitlynn WITH CANNULA
Aspiration Statement: Aspiration was performed prior to injecting site with filler.

## 2023-09-18 DIAGNOSIS — I10 HYPERTENSION, UNSPECIFIED TYPE: ICD-10-CM

## 2023-09-18 DIAGNOSIS — R00.0 SINUS TACHYCARDIA: ICD-10-CM

## 2023-09-18 RX ORDER — METOPROLOL SUCCINATE 25 MG/1
25 TABLET, EXTENDED RELEASE ORAL EVERY MORNING
Qty: 90 TABLET | Refills: 1 | Status: SHIPPED | OUTPATIENT
Start: 2023-09-18 | End: 2024-02-29

## 2023-12-15 DIAGNOSIS — F41.9 ANXIETY: ICD-10-CM

## 2023-12-15 RX ORDER — PAROXETINE HYDROCHLORIDE 40 MG/1
40 TABLET, FILM COATED ORAL EVERY MORNING
Qty: 90 TABLET | Refills: 2 | Status: SHIPPED | OUTPATIENT
Start: 2023-12-15

## 2023-12-15 NOTE — TELEPHONE ENCOUNTER
Received request via: Pharmacy    Was the patient seen in the last year in this department? No, last seen on 10/10/2022    Does the patient have an active prescription (recently filled or refills available) for medication(s) requested? No    Does the patient have half-way Plus and need 100 day supply (blood pressure, diabetes and cholesterol meds only)? Patient does not have SCP

## 2024-02-29 DIAGNOSIS — I10 HYPERTENSION, UNSPECIFIED TYPE: ICD-10-CM

## 2024-02-29 DIAGNOSIS — R00.0 SINUS TACHYCARDIA: ICD-10-CM

## 2024-02-29 RX ORDER — METOPROLOL SUCCINATE 25 MG/1
25 TABLET, EXTENDED RELEASE ORAL EVERY MORNING
Qty: 90 TABLET | Refills: 0 | Status: SHIPPED | OUTPATIENT
Start: 2024-02-29

## 2024-03-01 NOTE — TELEPHONE ENCOUNTER
Received request via: Pharmacy    Was the patient seen in the last year in this department? No, last seen 10/10/2022    Does the patient have an active prescription (recently filled or refills available) for medication(s) requested? No    Pharmacy Name: [facility]    Does the patient have alf Plus and need 100 day supply (blood pressure, diabetes and cholesterol meds only)? Patient does not have SCP

## 2024-03-01 NOTE — TELEPHONE ENCOUNTER
Patients insurance is not accepted at our clinic anymore patient is still looking for a new pcp and would like a few months refill until she can re establish somewhere else.

## 2024-03-04 RX ORDER — ATORVASTATIN CALCIUM 20 MG/1
TABLET, FILM COATED ORAL
Qty: 100 TABLET | Refills: 3 | Status: SHIPPED | OUTPATIENT
Start: 2024-03-04

## 2024-03-04 NOTE — TELEPHONE ENCOUNTER
Received request via: Pharmacy    Was the patient seen in the last year in this department? No, pt was last seen on 10/19/2022    Does the patient have an active prescription (recently filled or refills available) for medication(s) requested? No    Pharmacy Name: [facility]    Does the patient have prison Plus and need 100 day supply (blood pressure, diabetes and cholesterol meds only)? Patient does not have SCP

## 2024-06-25 ENCOUNTER — APPOINTMENT (RX ONLY)
Dept: URBAN - METROPOLITAN AREA CLINIC 35 | Facility: CLINIC | Age: 69
Setting detail: DERMATOLOGY
End: 2024-06-25

## 2024-06-25 DIAGNOSIS — Z41.9 ENCOUNTER FOR PROCEDURE FOR PURPOSES OTHER THAN REMEDYING HEALTH STATE, UNSPECIFIED: ICD-10-CM

## 2024-06-25 PROCEDURE — ? BOTOX

## 2024-06-25 PROCEDURE — ? FILLERS

## 2024-06-25 PROCEDURE — ? ADDITIONAL NOTES

## 2024-06-25 NOTE — PROCEDURE: FILLERS
Additional Area 4 Volume In Cc: 0
Additional Area 1 Location: Oral Commisures
Price (Use Numbers Only, No Special Characters Or $): 180
Include Cannula Information In Note?: No
Additional Area 5 Location: Earlobes
Additional Area 4 Location: Scars
Post-Care Instructions: Patient instructed to apply ice to reduce swelling.
Consent: Written consent obtained. Risks include but not limited to bruising, bleeding, blindness, stroke, delayed onset of nodules, irregular texture, ulceration, infection, allergic reaction, scar formation, incomplete augmentation, temporary nature, procedural pain.
Additional Area 2 Location: Border of lips
Lot #: [id number]
Filler Comments: Juvederm Ultra XC 1.0 ml, see face map
Additional Area 3 Location: Fine lines around mouth
Use Map Statement For Sites (Optional): Yes
Filler: Juvederm Ultra XC
Expiration Date (Month Year): 2025-12-24
Map Statment: See Attach Map for Complete Details
Detail Level: Detailed
Aspiration Statement: Aspiration was performed prior to injecting site with filler.

## 2024-06-25 NOTE — PROCEDURE: BOTOX
Price (Use Numbers Only, No Special Characters Or $): 344
Forehead Units: 0
Detail Level: Detailed
Expiration Date (Month Year): 2026-09
Additional Area 3 Location: Frontalis
Additional Area 5 Location: perioral
Lot #: [id number]
Consent: Verbal and written informed consent were obtained to include the following risks: pain, swelling, bruising, eyelid or eyebrow droop, and lack of visible improvement of wrinkles in the areas treated.  The skin was cleansed with alcohol. Injections were administered with a 32g needle into the following areas:
Additional Area 2 Location: Crows Feet
Post-Care Instructions: Patient instructed to not lie down for 4 hours after injections and limit physical activity for 24 hours.
Additional Area 6 Location: platysma
Dilution (U/0.1 Cc): 1.1
Additional Area 1 Location: Glabella
Additional Area 4 Location: Bunny lines
Show Additional Area 1: Yes
Show Right And Left Pupillary Line Units: No
Additional Area 1 Units: 24
Incrementing Botox Units: By 0.1 Units